# Patient Record
Sex: MALE | Race: WHITE | NOT HISPANIC OR LATINO | Employment: FULL TIME | URBAN - METROPOLITAN AREA
[De-identification: names, ages, dates, MRNs, and addresses within clinical notes are randomized per-mention and may not be internally consistent; named-entity substitution may affect disease eponyms.]

---

## 2024-09-19 PROCEDURE — 93010 ELECTROCARDIOGRAM REPORT: CPT | Performed by: INTERNAL MEDICINE

## 2024-10-09 ENCOUNTER — APPOINTMENT (OUTPATIENT)
Dept: RADIOLOGY | Facility: HOSPITAL | Age: 49
DRG: 291 | End: 2024-10-09
Payer: COMMERCIAL

## 2024-10-09 ENCOUNTER — HOSPITAL ENCOUNTER (INPATIENT)
Facility: HOSPITAL | Age: 49
DRG: 291 | End: 2024-10-09
Attending: EMERGENCY MEDICINE | Admitting: NURSE PRACTITIONER
Payer: COMMERCIAL

## 2024-10-09 ENCOUNTER — APPOINTMENT (OUTPATIENT)
Dept: CARDIOLOGY | Facility: HOSPITAL | Age: 49
DRG: 291 | End: 2024-10-09
Payer: COMMERCIAL

## 2024-10-09 DIAGNOSIS — R06.02 SHORTNESS OF BREATH: ICD-10-CM

## 2024-10-09 DIAGNOSIS — E87.70 HYPERVOLEMIA: Primary | ICD-10-CM

## 2024-10-09 DIAGNOSIS — E83.42 HYPOMAGNESEMIA: ICD-10-CM

## 2024-10-09 DIAGNOSIS — I48.91 UNSPECIFIED ATRIAL FIBRILLATION (MULTI): ICD-10-CM

## 2024-10-09 DIAGNOSIS — I48.19 PERSISTENT ATRIAL FIBRILLATION (MULTI): ICD-10-CM

## 2024-10-09 DIAGNOSIS — I48.91 ATRIAL FIBRILLATION (MULTI): ICD-10-CM

## 2024-10-09 DIAGNOSIS — I50.33 ACUTE ON CHRONIC HEART FAILURE WITH PRESERVED EJECTION FRACTION: ICD-10-CM

## 2024-10-09 DIAGNOSIS — R09.02 HYPOXIA: ICD-10-CM

## 2024-10-09 LAB
ALBUMIN SERPL BCP-MCNC: 4.1 G/DL (ref 3.4–5)
ALP SERPL-CCNC: 41 U/L (ref 33–120)
ALT SERPL W P-5'-P-CCNC: 13 U/L (ref 10–52)
ANION GAP SERPL CALC-SCNC: 11 MMOL/L (ref 10–20)
APPEARANCE UR: CLEAR
AST SERPL W P-5'-P-CCNC: 16 U/L (ref 9–39)
ATRIAL RATE: 83 BPM
ATRIAL RATE: 93 BPM
BASOPHILS # BLD AUTO: 0.06 X10*3/UL (ref 0–0.1)
BASOPHILS NFR BLD AUTO: 0.8 %
BILIRUB SERPL-MCNC: 1.1 MG/DL (ref 0–1.2)
BILIRUB UR STRIP.AUTO-MCNC: NEGATIVE MG/DL
BNP SERPL-MCNC: 312 PG/ML (ref 0–99)
BUN SERPL-MCNC: 22 MG/DL (ref 6–23)
CALCIUM SERPL-MCNC: 9.4 MG/DL (ref 8.6–10.3)
CARDIAC TROPONIN I PNL SERPL HS: 10 NG/L (ref 0–20)
CARDIAC TROPONIN I PNL SERPL HS: 12 NG/L (ref 0–20)
CHLORIDE SERPL-SCNC: 100 MMOL/L (ref 98–107)
CO2 SERPL-SCNC: 30 MMOL/L (ref 21–32)
COLOR UR: YELLOW
CREAT SERPL-MCNC: 0.98 MG/DL (ref 0.5–1.3)
D DIMER PPP FEU-MCNC: 320 NG/ML FEU
EGFRCR SERPLBLD CKD-EPI 2021: >90 ML/MIN/1.73M*2
EOSINOPHIL # BLD AUTO: 0.21 X10*3/UL (ref 0–0.7)
EOSINOPHIL NFR BLD AUTO: 3 %
ERYTHROCYTE [DISTWIDTH] IN BLOOD BY AUTOMATED COUNT: 15.7 % (ref 11.5–14.5)
GLUCOSE BLD MANUAL STRIP-MCNC: 140 MG/DL (ref 74–99)
GLUCOSE SERPL-MCNC: 124 MG/DL (ref 74–99)
GLUCOSE UR STRIP.AUTO-MCNC: ABNORMAL MG/DL
HCT VFR BLD AUTO: 50.8 % (ref 41–52)
HGB BLD-MCNC: 15.5 G/DL (ref 13.5–17.5)
IMM GRANULOCYTES # BLD AUTO: 0.02 X10*3/UL (ref 0–0.7)
IMM GRANULOCYTES NFR BLD AUTO: 0.3 % (ref 0–0.9)
INR PPP: 1.5 (ref 0.9–1.1)
KETONES UR STRIP.AUTO-MCNC: NEGATIVE MG/DL
LACTATE SERPL-SCNC: 1.3 MMOL/L (ref 0.4–2)
LEUKOCYTE ESTERASE UR QL STRIP.AUTO: NEGATIVE
LYMPHOCYTES # BLD AUTO: 0.95 X10*3/UL (ref 1.2–4.8)
LYMPHOCYTES NFR BLD AUTO: 13.4 %
MAGNESIUM SERPL-MCNC: 1.49 MG/DL (ref 1.6–2.4)
MCH RBC QN AUTO: 27 PG (ref 26–34)
MCHC RBC AUTO-ENTMCNC: 30.5 G/DL (ref 32–36)
MCV RBC AUTO: 89 FL (ref 80–100)
MONOCYTES # BLD AUTO: 0.57 X10*3/UL (ref 0.1–1)
MONOCYTES NFR BLD AUTO: 8 %
NEUTROPHILS # BLD AUTO: 5.28 X10*3/UL (ref 1.2–7.7)
NEUTROPHILS NFR BLD AUTO: 74.5 %
NITRITE UR QL STRIP.AUTO: NEGATIVE
NRBC BLD-RTO: 0 /100 WBCS (ref 0–0)
PH UR STRIP.AUTO: 6.5 [PH]
PLATELET # BLD AUTO: 168 X10*3/UL (ref 150–450)
POTASSIUM SERPL-SCNC: 4.1 MMOL/L (ref 3.5–5.3)
PROT SERPL-MCNC: 7 G/DL (ref 6.4–8.2)
PROT UR STRIP.AUTO-MCNC: ABNORMAL MG/DL
PROTHROMBIN TIME: 16.9 SECONDS (ref 9.8–12.8)
Q ONSET: 209 MS
Q ONSET: 212 MS
QRS COUNT: 16 BEATS
QRS COUNT: 17 BEATS
QRS DURATION: 86 MS
QRS DURATION: 90 MS
QT INTERVAL: 346 MS
QT INTERVAL: 358 MS
QTC CALCULATION(BAZETT): 444 MS
QTC CALCULATION(BAZETT): 459 MS
QTC FREDERICIA: 408 MS
QTC FREDERICIA: 423 MS
R AXIS: -68 DEGREES
R AXIS: -74 DEGREES
RBC # BLD AUTO: 5.74 X10*6/UL (ref 4.5–5.9)
RBC # UR STRIP.AUTO: NEGATIVE /UL
RBC #/AREA URNS AUTO: NORMAL /HPF
SARS-COV-2 RNA RESP QL NAA+PROBE: NOT DETECTED
SODIUM SERPL-SCNC: 137 MMOL/L (ref 136–145)
SP GR UR STRIP.AUTO: 1.02
T AXIS: 51 DEGREES
T AXIS: 56 DEGREES
T OFFSET: 385 MS
T OFFSET: 388 MS
UROBILINOGEN UR STRIP.AUTO-MCNC: ABNORMAL MG/DL
VENTRICULAR RATE: 99 BPM
VENTRICULAR RATE: 99 BPM
WBC # BLD AUTO: 7.1 X10*3/UL (ref 4.4–11.3)
WBC #/AREA URNS AUTO: NORMAL /HPF

## 2024-10-09 PROCEDURE — 2500000005 HC RX 250 GENERAL PHARMACY W/O HCPCS: Performed by: PHYSICIAN ASSISTANT

## 2024-10-09 PROCEDURE — 2500000004 HC RX 250 GENERAL PHARMACY W/ HCPCS (ALT 636 FOR OP/ED): Performed by: PHYSICIAN ASSISTANT

## 2024-10-09 PROCEDURE — 93005 ELECTROCARDIOGRAM TRACING: CPT

## 2024-10-09 PROCEDURE — 80053 COMPREHEN METABOLIC PANEL: CPT | Performed by: PHYSICIAN ASSISTANT

## 2024-10-09 PROCEDURE — 84484 ASSAY OF TROPONIN QUANT: CPT | Performed by: PHYSICIAN ASSISTANT

## 2024-10-09 PROCEDURE — 83605 ASSAY OF LACTIC ACID: CPT | Performed by: PHYSICIAN ASSISTANT

## 2024-10-09 PROCEDURE — 81001 URINALYSIS AUTO W/SCOPE: CPT | Performed by: PHYSICIAN ASSISTANT

## 2024-10-09 PROCEDURE — 71045 X-RAY EXAM CHEST 1 VIEW: CPT | Performed by: RADIOLOGY

## 2024-10-09 PROCEDURE — 99285 EMERGENCY DEPT VISIT HI MDM: CPT | Mod: CS

## 2024-10-09 PROCEDURE — 99223 1ST HOSP IP/OBS HIGH 75: CPT | Performed by: NURSE PRACTITIONER

## 2024-10-09 PROCEDURE — 85025 COMPLETE CBC W/AUTO DIFF WBC: CPT | Performed by: PHYSICIAN ASSISTANT

## 2024-10-09 PROCEDURE — 36415 COLL VENOUS BLD VENIPUNCTURE: CPT | Performed by: PHYSICIAN ASSISTANT

## 2024-10-09 PROCEDURE — 87635 SARS-COV-2 COVID-19 AMP PRB: CPT | Performed by: PHYSICIAN ASSISTANT

## 2024-10-09 PROCEDURE — 2500000001 HC RX 250 WO HCPCS SELF ADMINISTERED DRUGS (ALT 637 FOR MEDICARE OP): Performed by: NURSE PRACTITIONER

## 2024-10-09 PROCEDURE — 71275 CT ANGIOGRAPHY CHEST: CPT | Mod: FOREIGN READ | Performed by: RADIOLOGY

## 2024-10-09 PROCEDURE — 85379 FIBRIN DEGRADATION QUANT: CPT | Performed by: PHYSICIAN ASSISTANT

## 2024-10-09 PROCEDURE — 71045 X-RAY EXAM CHEST 1 VIEW: CPT

## 2024-10-09 PROCEDURE — 83735 ASSAY OF MAGNESIUM: CPT | Performed by: PHYSICIAN ASSISTANT

## 2024-10-09 PROCEDURE — 2060000001 HC INTERMEDIATE ICU ROOM DAILY

## 2024-10-09 PROCEDURE — 2550000001 HC RX 255 CONTRASTS: Performed by: PHYSICIAN ASSISTANT

## 2024-10-09 PROCEDURE — 82947 ASSAY GLUCOSE BLOOD QUANT: CPT

## 2024-10-09 PROCEDURE — 85610 PROTHROMBIN TIME: CPT | Performed by: PHYSICIAN ASSISTANT

## 2024-10-09 PROCEDURE — 71275 CT ANGIOGRAPHY CHEST: CPT

## 2024-10-09 PROCEDURE — 83880 ASSAY OF NATRIURETIC PEPTIDE: CPT | Performed by: PHYSICIAN ASSISTANT

## 2024-10-09 RX ORDER — POLYETHYLENE GLYCOL 3350 17 G/17G
17 POWDER, FOR SOLUTION ORAL DAILY
Status: DISCONTINUED | OUTPATIENT
Start: 2024-10-10 | End: 2024-10-14 | Stop reason: HOSPADM

## 2024-10-09 RX ORDER — MAGNESIUM SULFATE HEPTAHYDRATE 40 MG/ML
2 INJECTION, SOLUTION INTRAVENOUS ONCE
Status: COMPLETED | OUTPATIENT
Start: 2024-10-09 | End: 2024-10-09

## 2024-10-09 RX ORDER — DEXTROSE 50 % IN WATER (D50W) INTRAVENOUS SYRINGE
25
Status: DISCONTINUED | OUTPATIENT
Start: 2024-10-09 | End: 2024-10-14 | Stop reason: HOSPADM

## 2024-10-09 RX ORDER — APIXABAN 5 MG/1
5 TABLET, FILM COATED ORAL 2 TIMES DAILY
COMMUNITY

## 2024-10-09 RX ORDER — METOPROLOL TARTRATE 100 MG/1
100 TABLET ORAL 2 TIMES DAILY
COMMUNITY
Start: 2024-09-26 | End: 2024-10-14 | Stop reason: HOSPADM

## 2024-10-09 RX ORDER — POTASSIUM CHLORIDE 750 MG/1
10 CAPSULE, EXTENDED RELEASE ORAL DAILY
Status: ON HOLD | COMMUNITY
Start: 2023-03-24 | End: 2024-10-09 | Stop reason: ALTCHOICE

## 2024-10-09 RX ORDER — ACETAMINOPHEN 325 MG/1
650 TABLET ORAL EVERY 6 HOURS PRN
Status: DISCONTINUED | OUTPATIENT
Start: 2024-10-09 | End: 2024-10-14 | Stop reason: HOSPADM

## 2024-10-09 RX ORDER — GLIPIZIDE 5 MG/1
20 TABLET, FILM COATED, EXTENDED RELEASE ORAL DAILY
Status: DISCONTINUED | OUTPATIENT
Start: 2024-10-10 | End: 2024-10-14 | Stop reason: HOSPADM

## 2024-10-09 RX ORDER — INSULIN LISPRO 100 [IU]/ML
0-5 INJECTION, SOLUTION INTRAVENOUS; SUBCUTANEOUS
Status: DISCONTINUED | OUTPATIENT
Start: 2024-10-10 | End: 2024-10-10

## 2024-10-09 RX ORDER — TIRZEPATIDE 15 MG/.5ML
INJECTION, SOLUTION SUBCUTANEOUS
COMMUNITY
Start: 2024-01-09

## 2024-10-09 RX ORDER — FUROSEMIDE 10 MG/ML
40 INJECTION INTRAMUSCULAR; INTRAVENOUS ONCE
Status: COMPLETED | OUTPATIENT
Start: 2024-10-09 | End: 2024-10-09

## 2024-10-09 RX ORDER — LISINOPRIL 40 MG/1
40 TABLET ORAL DAILY
COMMUNITY
Start: 2024-09-26 | End: 2024-10-14 | Stop reason: HOSPADM

## 2024-10-09 RX ORDER — ACETAMINOPHEN 650 MG/1
650 SUPPOSITORY RECTAL EVERY 6 HOURS PRN
Status: DISCONTINUED | OUTPATIENT
Start: 2024-10-09 | End: 2024-10-14 | Stop reason: HOSPADM

## 2024-10-09 RX ORDER — FUROSEMIDE 10 MG/ML
40 INJECTION INTRAMUSCULAR; INTRAVENOUS 2 TIMES DAILY
Status: DISCONTINUED | OUTPATIENT
Start: 2024-10-10 | End: 2024-10-12

## 2024-10-09 RX ORDER — FUROSEMIDE 20 MG/1
20 TABLET ORAL DAILY
COMMUNITY
Start: 2024-09-26 | End: 2024-10-14 | Stop reason: HOSPADM

## 2024-10-09 RX ORDER — GLIPIZIDE 10 MG/1
20 TABLET, FILM COATED, EXTENDED RELEASE ORAL DAILY
COMMUNITY

## 2024-10-09 RX ORDER — LANOLIN ALCOHOL/MO/W.PET/CERES
400 CREAM (GRAM) TOPICAL DAILY
Status: DISCONTINUED | OUTPATIENT
Start: 2024-10-09 | End: 2024-10-12

## 2024-10-09 RX ORDER — LISINOPRIL 20 MG/1
40 TABLET ORAL DAILY
Status: DISCONTINUED | OUTPATIENT
Start: 2024-10-10 | End: 2024-10-10

## 2024-10-09 RX ORDER — METOPROLOL TARTRATE 50 MG/1
100 TABLET ORAL 2 TIMES DAILY
Status: DISCONTINUED | OUTPATIENT
Start: 2024-10-09 | End: 2024-10-10

## 2024-10-09 RX ORDER — DEXTROSE 50 % IN WATER (D50W) INTRAVENOUS SYRINGE
12.5
Status: DISCONTINUED | OUTPATIENT
Start: 2024-10-09 | End: 2024-10-14 | Stop reason: HOSPADM

## 2024-10-09 RX ORDER — ACETAMINOPHEN 160 MG/5ML
650 SOLUTION ORAL EVERY 6 HOURS PRN
Status: DISCONTINUED | OUTPATIENT
Start: 2024-10-09 | End: 2024-10-14 | Stop reason: HOSPADM

## 2024-10-09 SDOH — ECONOMIC STABILITY: INCOME INSECURITY: IN THE PAST 12 MONTHS, HAS THE ELECTRIC, GAS, OIL, OR WATER COMPANY THREATENED TO SHUT OFF SERVICE IN YOUR HOME?: NO

## 2024-10-09 SDOH — ECONOMIC STABILITY: INCOME INSECURITY: IN THE PAST 12 MONTHS HAS THE ELECTRIC, GAS, OIL, OR WATER COMPANY THREATENED TO SHUT OFF SERVICES IN YOUR HOME?: NO

## 2024-10-09 SDOH — SOCIAL STABILITY: SOCIAL INSECURITY: ARE THERE ANY APPARENT SIGNS OF INJURIES/BEHAVIORS THAT COULD BE RELATED TO ABUSE/NEGLECT?: NO

## 2024-10-09 SDOH — ECONOMIC STABILITY: INCOME INSECURITY: IN THE LAST 12 MONTHS, WAS THERE A TIME WHEN YOU WERE NOT ABLE TO PAY THE MORTGAGE OR RENT ON TIME?: NO

## 2024-10-09 SDOH — SOCIAL STABILITY: SOCIAL INSECURITY: WITHIN THE LAST YEAR, HAVE YOU BEEN AFRAID OF YOUR PARTNER OR EX-PARTNER?: NO

## 2024-10-09 SDOH — SOCIAL STABILITY: SOCIAL INSECURITY: HAVE YOU HAD THOUGHTS OF HARMING ANYONE ELSE?: NO

## 2024-10-09 SDOH — ECONOMIC STABILITY: INCOME INSECURITY: HOW HARD IS IT FOR YOU TO PAY FOR THE VERY BASICS LIKE FOOD, HOUSING, MEDICAL CARE, AND HEATING?: NOT HARD AT ALL

## 2024-10-09 SDOH — ECONOMIC STABILITY: FOOD INSECURITY: WITHIN THE PAST 12 MONTHS, THE FOOD YOU BOUGHT JUST DIDN'T LAST AND YOU DIDN'T HAVE MONEY TO GET MORE.: NEVER TRUE

## 2024-10-09 SDOH — ECONOMIC STABILITY: FOOD INSECURITY: WITHIN THE PAST 12 MONTHS, YOU WORRIED THAT YOUR FOOD WOULD RUN OUT BEFORE YOU GOT MONEY TO BUY MORE.: NEVER TRUE

## 2024-10-09 SDOH — SOCIAL STABILITY: SOCIAL INSECURITY
WITHIN THE LAST YEAR, HAVE TO BEEN RAPED OR FORCED TO HAVE ANY KIND OF SEXUAL ACTIVITY BY YOUR PARTNER OR EX-PARTNER?: NO

## 2024-10-09 SDOH — ECONOMIC STABILITY: FOOD INSECURITY: HOW HARD IS IT FOR YOU TO PAY FOR THE VERY BASICS LIKE FOOD, HOUSING, MEDICAL CARE, AND HEATING?: NOT HARD AT ALL

## 2024-10-09 SDOH — SOCIAL STABILITY: SOCIAL INSECURITY: DOES ANYONE TRY TO KEEP YOU FROM HAVING/CONTACTING OTHER FRIENDS OR DOING THINGS OUTSIDE YOUR HOME?: NO

## 2024-10-09 SDOH — SOCIAL STABILITY: SOCIAL INSECURITY
WITHIN THE LAST YEAR, HAVE YOU BEEN KICKED, HIT, SLAPPED, OR OTHERWISE PHYSICALLY HURT BY YOUR PARTNER OR EX-PARTNER?: NO

## 2024-10-09 SDOH — ECONOMIC STABILITY: FOOD INSECURITY: WITHIN THE PAST 12 MONTHS, YOU WORRIED THAT YOUR FOOD WOULD RUN OUT BEFORE YOU GOT THE MONEY TO BUY MORE.: NEVER TRUE

## 2024-10-09 SDOH — ECONOMIC STABILITY: TRANSPORTATION INSECURITY
IN THE PAST 12 MONTHS, HAS LACK OF TRANSPORTATION KEPT YOU FROM MEETINGS, WORK, OR FROM GETTING THINGS NEEDED FOR DAILY LIVING?: NO

## 2024-10-09 SDOH — SOCIAL STABILITY: SOCIAL INSECURITY: ARE YOU OR HAVE YOU BEEN THREATENED OR ABUSED PHYSICALLY, EMOTIONALLY, OR SEXUALLY BY ANYONE?: NO

## 2024-10-09 SDOH — ECONOMIC STABILITY: HOUSING INSECURITY: IN THE PAST 12 MONTHS, HOW MANY TIMES HAVE YOU MOVED WHERE YOU WERE LIVING?: 0

## 2024-10-09 SDOH — SOCIAL STABILITY: SOCIAL INSECURITY: HAS ANYONE EVER THREATENED TO HURT YOUR FAMILY OR YOUR PETS?: NO

## 2024-10-09 SDOH — SOCIAL STABILITY: SOCIAL INSECURITY: ABUSE: ADULT

## 2024-10-09 SDOH — ECONOMIC STABILITY: HOUSING INSECURITY: IN THE LAST 12 MONTHS, WAS THERE A TIME WHEN YOU WERE NOT ABLE TO PAY THE MORTGAGE OR RENT ON TIME?: NO

## 2024-10-09 SDOH — ECONOMIC STABILITY: HOUSING INSECURITY: AT ANY TIME IN THE PAST 12 MONTHS, WERE YOU HOMELESS OR LIVING IN A SHELTER (INCLUDING NOW)?: NO

## 2024-10-09 SDOH — SOCIAL STABILITY: SOCIAL INSECURITY: DO YOU FEEL ANYONE HAS EXPLOITED OR TAKEN ADVANTAGE OF YOU FINANCIALLY OR OF YOUR PERSONAL PROPERTY?: NO

## 2024-10-09 SDOH — SOCIAL STABILITY: SOCIAL INSECURITY: WITHIN THE LAST YEAR, HAVE YOU BEEN HUMILIATED OR EMOTIONALLY ABUSED IN OTHER WAYS BY YOUR PARTNER OR EX-PARTNER?: NO

## 2024-10-09 SDOH — SOCIAL STABILITY: SOCIAL INSECURITY
WITHIN THE LAST YEAR, HAVE YOU BEEN RAPED OR FORCED TO HAVE ANY KIND OF SEXUAL ACTIVITY BY YOUR PARTNER OR EX-PARTNER?: NO

## 2024-10-09 SDOH — ECONOMIC STABILITY: TRANSPORTATION INSECURITY
IN THE PAST 12 MONTHS, HAS THE LACK OF TRANSPORTATION KEPT YOU FROM MEDICAL APPOINTMENTS OR FROM GETTING MEDICATIONS?: NO

## 2024-10-09 SDOH — ECONOMIC STABILITY: TRANSPORTATION INSECURITY: IN THE PAST 12 MONTHS, HAS LACK OF TRANSPORTATION KEPT YOU FROM MEDICAL APPOINTMENTS OR FROM GETTING MEDICATIONS?: NO

## 2024-10-09 SDOH — SOCIAL STABILITY: SOCIAL INSECURITY: DO YOU FEEL UNSAFE GOING BACK TO THE PLACE WHERE YOU ARE LIVING?: NO

## 2024-10-09 SDOH — SOCIAL STABILITY: SOCIAL INSECURITY: WERE YOU ABLE TO COMPLETE ALL THE BEHAVIORAL HEALTH SCREENINGS?: YES

## 2024-10-09 ASSESSMENT — ENCOUNTER SYMPTOMS
DIARRHEA: 0
NAUSEA: 0
FLANK PAIN: 0
CONSTIPATION: 0
ABDOMINAL DISTENTION: 1
HEMATURIA: 0
ABDOMINAL PAIN: 0
CHILLS: 0
FREQUENCY: 0
FEVER: 0
SHORTNESS OF BREATH: 1
VOMITING: 0
DYSURIA: 0
PALPITATIONS: 0

## 2024-10-09 ASSESSMENT — COGNITIVE AND FUNCTIONAL STATUS - GENERAL
MOBILITY SCORE: 24
PATIENT BASELINE BEDBOUND: NO
DAILY ACTIVITIY SCORE: 24

## 2024-10-09 ASSESSMENT — LIFESTYLE VARIABLES
AUDIT-C TOTAL SCORE: 0
HAVE PEOPLE ANNOYED YOU BY CRITICIZING YOUR DRINKING: NO
TOTAL SCORE: 0
HOW OFTEN DO YOU HAVE 6 OR MORE DRINKS ON ONE OCCASION: NEVER
HOW OFTEN DO YOU HAVE A DRINK CONTAINING ALCOHOL: NEVER
EVER FELT BAD OR GUILTY ABOUT YOUR DRINKING: NO
HOW MANY STANDARD DRINKS CONTAINING ALCOHOL DO YOU HAVE ON A TYPICAL DAY: PATIENT DOES NOT DRINK
HAVE YOU EVER FELT YOU SHOULD CUT DOWN ON YOUR DRINKING: NO
EVER HAD A DRINK FIRST THING IN THE MORNING TO STEADY YOUR NERVES TO GET RID OF A HANGOVER: NO
AUDIT-C TOTAL SCORE: 0
SKIP TO QUESTIONS 9-10: 1

## 2024-10-09 ASSESSMENT — PATIENT HEALTH QUESTIONNAIRE - PHQ9
SUM OF ALL RESPONSES TO PHQ9 QUESTIONS 1 & 2: 0
1. LITTLE INTEREST OR PLEASURE IN DOING THINGS: NOT AT ALL
2. FEELING DOWN, DEPRESSED OR HOPELESS: NOT AT ALL

## 2024-10-09 ASSESSMENT — ACTIVITIES OF DAILY LIVING (ADL)
DRESSING YOURSELF: INDEPENDENT
LACK_OF_TRANSPORTATION: NO
LACK_OF_TRANSPORTATION: NO
ADEQUATE_TO_COMPLETE_ADL: YES
LACK_OF_TRANSPORTATION: NO
JUDGMENT_ADEQUATE_SAFELY_COMPLETE_DAILY_ACTIVITIES: YES
HEARING - RIGHT EAR: FUNCTIONAL
BATHING: INDEPENDENT
TOILETING: INDEPENDENT
GROOMING: INDEPENDENT
HEARING - LEFT EAR: FUNCTIONAL
PATIENT'S MEMORY ADEQUATE TO SAFELY COMPLETE DAILY ACTIVITIES?: YES
FEEDING YOURSELF: INDEPENDENT
WALKS IN HOME: INDEPENDENT

## 2024-10-09 ASSESSMENT — PAIN SCALES - GENERAL: PAINLEVEL_OUTOF10: 0 - NO PAIN

## 2024-10-09 ASSESSMENT — PAIN DESCRIPTION - PROGRESSION: CLINICAL_PROGRESSION: NOT CHANGED

## 2024-10-09 ASSESSMENT — PAIN - FUNCTIONAL ASSESSMENT: PAIN_FUNCTIONAL_ASSESSMENT: 0-10

## 2024-10-09 NOTE — ED PROVIDER NOTES
HPI   Chief Complaint   Patient presents with    Shortness of Breath       HPI        Patient History   Past Medical History:   Diagnosis Date    COPD (chronic obstructive pulmonary disease) (Multi)      History reviewed. No pertinent surgical history.  No family history on file.  Social History     Tobacco Use    Smoking status: Unknown    Smokeless tobacco: Not on file   Substance Use Topics    Alcohol use: Defer    Drug use: Defer       Physical Exam   ED Triage Vitals   Temperature Heart Rate Respirations BP   10/09/24 1208 10/09/24 1208 10/09/24 1208 10/09/24 1208   36.6 °C (97.9 °F) 98 20 180/83      Pulse Ox Temp Source Heart Rate Source Patient Position   10/09/24 1208 10/09/24 1208 10/09/24 1208 10/09/24 1315   (!) 92 % Temporal Monitor Sitting      BP Location FiO2 (%)     10/09/24 1208 --     Right arm        Physical Exam      ED Course & MDM   Diagnoses as of 10/09/24 1949   Persistent atrial fibrillation (Multi)   Shortness of breath   Hypomagnesemia   Hypoxia   Hypervolemia                 No data recorded                         Labs Reviewed   CBC WITH AUTO DIFFERENTIAL - Abnormal       Result Value    WBC 7.1      nRBC 0.0      RBC 5.74      Hemoglobin 15.5      Hematocrit 50.8      MCV 89      MCH 27.0      MCHC 30.5 (*)     RDW 15.7 (*)     Platelets 168      Neutrophils % 74.5      Immature Granulocytes %, Automated 0.3      Lymphocytes % 13.4      Monocytes % 8.0      Eosinophils % 3.0      Basophils % 0.8      Neutrophils Absolute 5.28      Immature Granulocytes Absolute, Automated 0.02      Lymphocytes Absolute 0.95 (*)     Monocytes Absolute 0.57      Eosinophils Absolute 0.21      Basophils Absolute 0.06     COMPREHENSIVE METABOLIC PANEL - Abnormal    Glucose 124 (*)     Sodium 137      Potassium 4.1      Chloride 100      Bicarbonate 30      Anion Gap 11      Urea Nitrogen 22      Creatinine 0.98      eGFR >90      Calcium 9.4      Albumin 4.1      Alkaline Phosphatase 41      Total  Protein 7.0      AST 16      Bilirubin, Total 1.1      ALT 13     MAGNESIUM - Abnormal    Magnesium 1.49 (*)    PROTIME-INR - Abnormal    Protime 16.9 (*)     INR 1.5 (*)    B-TYPE NATRIURETIC PEPTIDE - Abnormal     (*)     Narrative:        <100 pg/mL - Heart failure unlikely  100-299 pg/mL - Intermediate probability of acute heart                  failure exacerbation. Correlate with clinical                  context and patient history.    >=300 pg/mL - Heart Failure likely. Correlate with clinical                  context and patient history.    BNP testing is performed using different testing methodology at [facility] than at other [facility]. Direct result comparisons should only be made within the same method.      URINALYSIS WITH REFLEX CULTURE AND MICROSCOPIC - Abnormal    Color, Urine Yellow      Appearance, Urine Clear      Specific Gravity, Urine 1.021      pH, Urine 6.5      Protein, Urine 30 (1+) (*)     Glucose, Urine 30 (TRACE) (*)     Blood, Urine NEGATIVE      Ketones, Urine NEGATIVE      Bilirubin, Urine NEGATIVE      Urobilinogen, Urine 2 (1+) (*)     Nitrite, Urine NEGATIVE      Leukocyte Esterase, Urine NEGATIVE     LACTATE - Normal    Lactate 1.3      Narrative:     Venipuncture immediately after or during the administration of Metamizole may lead to falsely low results. Testing should be performed immediately prior to Metamizole dosing.   SARS-COV-2 PCR - Normal    Coronavirus 2019, PCR Not Detected      Narrative:     This assay has received FDA Emergency Use Authorization (EUA) and is only authorized for the duration of time that circumstances exist to justify the authorization of the emergency use of in vitro diagnostic tests for the detection of SARS-CoV-2 virus and/or diagnosis of COVID-19 infection under section 564(b)(1) of the Act, 21 U.S.C. 360bbb-3(b)(1). This assay is an in vitro diagnostic nucleic acid amplification test for the qualitative detection of  SARS-CoV-2 from nasopharyngeal specimens and has been validated for use at [facility]. Negative results do not preclude COVID-19 infections and should not be used as the sole basis for diagnosis, treatment, or other management decisions.     D-DIMER, VTE EXCLUSION - Normal    D-Dimer, Quantitative VTE Exclusion 320      Narrative:     The VTE Exclusion D-Dimer assay is reported in ng/mL Fibrinogen Equivalent Units (FEU).    Per 's instructions for use, a value of less than 500 ng/mL (FEU) may help to exclude DVT or PE in outpatients when the assay is used with a clinical pretest probability assessment.([facility] must utilize and document eCalc 'Wells Score Deep Vein Thrombosis Risk' for DVT exclusion only. Emergency Department should utilize  Guidelines for Emergency Department Use of the VTE Exclusion D-Dimer and Clinical Pretest probability assessment model for DVT or PE exclusion.)   SERIAL TROPONIN-INITIAL - Normal    Troponin I, High Sensitivity 10      Narrative:     Less than 99th percentile of normal range cutoff-  Female and children under 18 years old <14 ng/L; Male <21 ng/L: Negative  Repeat testing should be performed if clinically indicated.     Female and children under 18 years old 14-50 ng/L; Male 21-50 ng/L:  Consistent with possible cardiac damage and possible increased clinical   risk. Serial measurements may help to assess extent of myocardial damage.     >50 ng/L: Consistent with cardiac damage, increased clinical risk and  myocardial infarction. Serial measurements may help assess extent of   myocardial damage.      NOTE: Children less than 1 year old may have higher baseline troponin   levels and results should be interpreted in conjunction with the overall   clinical context.     NOTE: Troponin I testing is performed using a different   testing methodology at [facility] than at other   [facility] hospitals. Direct result comparisons should only   be  made within the same method.   SERIAL TROPONIN, 1 HOUR - Normal    Troponin I, High Sensitivity 12      Narrative:     Less than 99th percentile of normal range cutoff-  Female and children under 18 years old <14 ng/L; Male <21 ng/L: Negative  Repeat testing should be performed if clinically indicated.     Female and children under 18 years old 14-50 ng/L; Male 21-50 ng/L:  Consistent with possible cardiac damage and possible increased clinical   risk. Serial measurements may help to assess extent of myocardial damage.     >50 ng/L: Consistent with cardiac damage, increased clinical risk and  myocardial infarction. Serial measurements may help assess extent of   myocardial damage.      NOTE: Children less than 1 year old may have higher baseline troponin   levels and results should be interpreted in conjunction with the overall   clinical context.     NOTE: Troponin I testing is performed using a different   testing methodology at [facility] than at other   [facility]. Direct result comparisons should only   be made within the same method.   URINALYSIS MICROSCOPIC WITH REFLEX CULTURE - Normal    WBC, Urine NONE      RBC, Urine 1-2     TROPONIN SERIES- (INITIAL, 1 HR)    Narrative:     The following orders were created for panel order Troponin I Series, High Sensitivity (0, 1 HR).  Procedure                               Abnormality         Status                     ---------                               -----------         ------                     Troponin I, High Sensiti...[[id number]]  Normal              Final result               Troponin, High Sensitivi...[[id number]]  Normal              Final result                 Please view results for these tests on the individual orders.   URINALYSIS WITH REFLEX CULTURE AND MICROSCOPIC    Narrative:     The following orders were created for panel order Urinalysis with Reflex Culture and Microscopic.  Procedure                                Abnormality         Status                     ---------                               -----------         ------                     Urinalysis with Reflex C...[[id number]]  Abnormal            Final result               Extra Urine Gray Tube[[id number]]                            In process                   Please view results for these tests on the individual orders.   EXTRA URINE GRAY TUBE      CT angio chest for pulmonary embolism   Final Result   Endotracheal tube with tip projecting above the april.   No evidence of pulmonary embolism or acute thoracic aortic pathology.   Diffuse bilateral groundglass opacities which may be infectious or   inflammatory. No pulmonary consolidation.   Signed by Delmar Buck MD      XR chest 1 view   Final Result   1.  Cardiomegaly.   2. Irregular interstitial prominence greatest toward the lung bases   may represent vascular congestion/edema. Infection not excluded.                  MACRO:   None.        Signed by: Yonas Toribio 10/9/2024 12:58 PM   Dictation workstation:   [id number]                Medical Decision Making  Patient handed off to me by Darrick Javed PA-C at 1600 pending CT PE study.  No acute findings on final read of CTA PE study.  He does have bilateral groundglass opacities that may be infectious or inflammatory.  He states that he has not had of any cough or URI type symptoms.  Unlikely pneumonia.  Magnesium 1.49 and will be given 2 g IV.  Possibly fluid overload and will be given Lasix 40 mg IV.  Walking pulse ox is 77% on room air and due to needing oxygen, will admit to the hospitalist.  Case discussed with MIROSLAVA Miramontes hospitalist who accepts patient for admission. Discussed starting ATBs, solumedrol and nebulizer treatments, but he states likely fluid overload and will hold on these treatments. ABG ordered and pending.    Procedure  Procedures     Jody Burnett PA-C  10/09/24 1934       Jody Burnett PA-C  10/09/24 1949

## 2024-10-09 NOTE — ED PROVIDER NOTES
HPI   Chief Complaint   Patient presents with    Shortness of Breath       A 48-year-old male patient with history of trach secondary to sleep apnea comes into the emergency department today with complaints of increasing shortness of breath as well as exertional dyspnea and lower extremity edema over the last month.  He states that about a month ago he converted to atrial fibrillation.  He states he had 2 cardioversions that were unsuccessful.  He states he has been getting lower extremity edema which she was put on Lasix for 20 mg once a day but it did not improve things.  On his own he started taking 40 mg twice a day.  He states he still continues to not have any improvement.  He states he is progressively having increasing shortness of breath cannot lay flat at night and lower extremity edema.  Denies any associated chest pain.  Denies any fevers or chills.  For this purpose comes into the emergency department today further evaluation.  Otherwise no complaints present time.              Patient History   Past Medical History:   Diagnosis Date    COPD (chronic obstructive pulmonary disease) (Multi)      History reviewed. No pertinent surgical history.  No family history on file.  Social History     Tobacco Use    Smoking status: Unknown    Smokeless tobacco: Not on file   Substance Use Topics    Alcohol use: Defer    Drug use: Defer       Physical Exam   ED Triage Vitals   Temperature Heart Rate Respirations BP   10/09/24 1208 10/09/24 1208 10/09/24 1208 10/09/24 1208   36.6 °C (97.9 °F) 98 20 180/83      Pulse Ox Temp Source Heart Rate Source Patient Position   10/09/24 1208 10/09/24 1208 10/09/24 1208 10/09/24 1315   (!) 92 % Temporal Monitor Sitting      BP Location FiO2 (%)     10/09/24 1208 --     Right arm        Physical Exam  Constitutional:       General: He is in acute distress.      Appearance: Normal appearance. He is well-developed. He is obese. He is not ill-appearing.   HENT:      Head:  Normocephalic and atraumatic.      Nose: Nose normal.   Eyes:      Extraocular Movements: Extraocular movements intact.      Conjunctiva/sclera: Conjunctivae normal.      Pupils: Pupils are equal, round, and reactive to light.   Cardiovascular:      Rate and Rhythm: Regular rhythm. Tachycardia present.   Pulmonary:      Effort: Pulmonary effort is normal. Tachypnea present. No respiratory distress.      Breath sounds: Normal breath sounds. No stridor. No decreased breath sounds or wheezing.   Musculoskeletal:         General: Normal range of motion.      Cervical back: Normal range of motion.   Skin:     General: Skin is warm and dry.   Neurological:      General: No focal deficit present.      Mental Status: He is alert and oriented to person, place, and time. Mental status is at baseline.           ED Course & MDM   Diagnoses as of 10/09/24 1612   Persistent atrial fibrillation (Multi)                 No data recorded                                 Medical Decision Making  A 48-year-old male patient with history of trach secondary to sleep apnea comes into the emergency department today with complaints of increasing shortness of breath as well as exertional dyspnea and lower extremity edema over the last month.  He states that about a month ago he converted to atrial fibrillation.  He states he had 2 cardioversions that were unsuccessful.  He states he has been getting lower extremity edema which she was put on Lasix for 20 mg once a day but it did not improve things.  On his own he started taking 40 mg twice a day.  He states he still continues to not have any improvement.  He states he is progressively having increasing shortness of breath cannot lay flat at night and lower extremity edema.  Denies any associated chest pain.  Denies any fevers or chills.  For this purpose comes into the emergency department today further evaluation.  Otherwise no complaints present time.    EKG, chest x-ray, laboratory studies  are ordered to rule out ACS, arrhythmias, lecture light abnormalities, leukocytosis, acute kidney injury, pneumonia, pneumothorax, pulmonary emboli with PE study.  Rule out congestive heart failure.  Oxygen ordered for the patient.    EKG: My EKG interpretation, 9 bpm, atrial fibrillation, no ST elevations, T wave abnormalities    Handoff to Jody Burnett PA-C pending laboratory studies, CT PE study, reevaluation and disposition  Labs Reviewed   CBC WITH AUTO DIFFERENTIAL - Abnormal       Result Value    WBC 7.1      nRBC 0.0      RBC 5.74      Hemoglobin 15.5      Hematocrit 50.8      MCV 89      MCH 27.0      MCHC 30.5 (*)     RDW 15.7 (*)     Platelets 168      Neutrophils % 74.5      Immature Granulocytes %, Automated 0.3      Lymphocytes % 13.4      Monocytes % 8.0      Eosinophils % 3.0      Basophils % 0.8      Neutrophils Absolute 5.28      Immature Granulocytes Absolute, Automated 0.02      Lymphocytes Absolute 0.95 (*)     Monocytes Absolute 0.57      Eosinophils Absolute 0.21      Basophils Absolute 0.06     COMPREHENSIVE METABOLIC PANEL - Abnormal    Glucose 124 (*)     Sodium 137      Potassium 4.1      Chloride 100      Bicarbonate 30      Anion Gap 11      Urea Nitrogen 22      Creatinine 0.98      eGFR >90      Calcium 9.4      Albumin 4.1      Alkaline Phosphatase 41      Total Protein 7.0      AST 16      Bilirubin, Total 1.1      ALT 13     MAGNESIUM - Abnormal    Magnesium 1.49 (*)    PROTIME-INR - Abnormal    Protime 16.9 (*)     INR 1.5 (*)    B-TYPE NATRIURETIC PEPTIDE - Abnormal     (*)     Narrative:        <100 pg/mL - Heart failure unlikely  100-299 pg/mL - Intermediate probability of acute heart                  failure exacerbation. Correlate with clinical                  context and patient history.    >=300 pg/mL - Heart Failure likely. Correlate with clinical                  context and patient history.    BNP testing is performed using different testing methodology at  [facility] than at other [facility]. Direct result comparisons should only be made within the same method.      LACTATE - Normal    Lactate 1.3      Narrative:     Venipuncture immediately after or during the administration of Metamizole may lead to falsely low results. Testing should be performed immediately prior to Metamizole dosing.   SARS-COV-2 PCR - Normal    Coronavirus 2019, PCR Not Detected      Narrative:     This assay has received FDA Emergency Use Authorization (EUA) and is only authorized for the duration of time that circumstances exist to justify the authorization of the emergency use of in vitro diagnostic tests for the detection of SARS-CoV-2 virus and/or diagnosis of COVID-19 infection under section 564(b)(1) of the Act, 21 U.S.C. 360bbb-3(b)(1). This assay is an in vitro diagnostic nucleic acid amplification test for the qualitative detection of SARS-CoV-2 from nasopharyngeal specimens and has been validated for use at [facility]. Negative results do not preclude COVID-19 infections and should not be used as the sole basis for diagnosis, treatment, or other management decisions.     D-DIMER, VTE EXCLUSION - Normal    D-Dimer, Quantitative VTE Exclusion 320      Narrative:     The VTE Exclusion D-Dimer assay is reported in ng/mL Fibrinogen Equivalent Units (FEU).    Per 's instructions for use, a value of less than 500 ng/mL (FEU) may help to exclude DVT or PE in outpatients when the assay is used with a clinical pretest probability assessment.([facility] must utilize and document eCalc 'Wells Score Deep Vein Thrombosis Risk' for DVT exclusion only. Emergency Department should utilize  Guidelines for Emergency Department Use of the VTE Exclusion D-Dimer and Clinical Pretest probability assessment model for DVT or PE exclusion.)   SERIAL TROPONIN-INITIAL - Normal    Troponin I, High Sensitivity 10      Narrative:     Less than 99th percentile of  normal range cutoff-  Female and children under 18 years old <14 ng/L; Male <21 ng/L: Negative  Repeat testing should be performed if clinically indicated.     Female and children under 18 years old 14-50 ng/L; Male 21-50 ng/L:  Consistent with possible cardiac damage and possible increased clinical   risk. Serial measurements may help to assess extent of myocardial damage.     >50 ng/L: Consistent with cardiac damage, increased clinical risk and  myocardial infarction. Serial measurements may help assess extent of   myocardial damage.      NOTE: Children less than 1 year old may have higher baseline troponin   levels and results should be interpreted in conjunction with the overall   clinical context.     NOTE: Troponin I testing is performed using a different   testing methodology at [facility] than at other   [facility]. Direct result comparisons should only   be made within the same method.   TROPONIN SERIES- (INITIAL, 1 HR)    Narrative:     The following orders were created for panel order Troponin I Series, High Sensitivity (0, 1 HR).  Procedure                               Abnormality         Status                     ---------                               -----------         ------                     Troponin I, High Sensiti...[[id number]]  Normal              Final result               Troponin, High Sensitivi...[[id number]]                      In process                   Please view results for these tests on the individual orders.   URINALYSIS WITH REFLEX CULTURE AND MICROSCOPIC    Narrative:     The following orders were created for panel order Urinalysis with Reflex Culture and Microscopic.  Procedure                               Abnormality         Status                     ---------                               -----------         ------                     Urinalysis with Reflex C...[[id number]]                                                 Extra Urine Shin  Tube[[id number]]                                                         Please view results for these tests on the individual orders.   URINALYSIS WITH REFLEX CULTURE AND MICROSCOPIC   EXTRA URINE GRAY TUBE   SERIAL TROPONIN, 1 HOUR        XR chest 1 view   Final Result   1.  Cardiomegaly.   2. Irregular interstitial prominence greatest toward the lung bases   may represent vascular congestion/edema. Infection not excluded.                  MACRO:   None.        Signed by: Yonas Toribio 10/9/2024 12:58 PM   Dictation workstation:   [id number]      CT angio chest for pulmonary embolism    (Results Pending)       Procedure  Procedures     Darrick Javed PA-C  10/09/24 7018

## 2024-10-10 ENCOUNTER — APPOINTMENT (OUTPATIENT)
Dept: CARDIOLOGY | Facility: HOSPITAL | Age: 49
DRG: 291 | End: 2024-10-10
Payer: COMMERCIAL

## 2024-10-10 LAB
ANION GAP SERPL CALC-SCNC: 10 MMOL/L (ref 10–20)
AORTIC VALVE MEAN GRADIENT: 3 MMHG
AORTIC VALVE PEAK VELOCITY: 1.15 M/S
ATRIAL RATE: 105 BPM
ATRIAL RATE: 107 BPM
AV PEAK GRADIENT: 5.3 MMHG
AVA (PEAK VEL): 2.53 CM2
AVA (VTI): 2.39 CM2
BUN SERPL-MCNC: 21 MG/DL (ref 6–23)
CALCIUM SERPL-MCNC: 9 MG/DL (ref 8.6–10.3)
CHLORIDE SERPL-SCNC: 97 MMOL/L (ref 98–107)
CHOLEST SERPL-MCNC: 112 MG/DL (ref 0–199)
CHOLESTEROL/HDL RATIO: 4.4
CO2 SERPL-SCNC: 33 MMOL/L (ref 21–32)
CREAT SERPL-MCNC: 0.94 MG/DL (ref 0.5–1.3)
EGFRCR SERPLBLD CKD-EPI 2021: >90 ML/MIN/1.73M*2
EJECTION FRACTION APICAL 4 CHAMBER: 32.4
EJECTION FRACTION: 70 %
ERYTHROCYTE [DISTWIDTH] IN BLOOD BY AUTOMATED COUNT: 15.6 % (ref 11.5–14.5)
GLUCOSE BLD MANUAL STRIP-MCNC: 107 MG/DL (ref 74–99)
GLUCOSE BLD MANUAL STRIP-MCNC: 138 MG/DL (ref 74–99)
GLUCOSE BLD MANUAL STRIP-MCNC: 153 MG/DL (ref 74–99)
GLUCOSE BLD MANUAL STRIP-MCNC: 190 MG/DL (ref 74–99)
GLUCOSE BLD MANUAL STRIP-MCNC: 86 MG/DL (ref 74–99)
GLUCOSE SERPL-MCNC: 122 MG/DL (ref 74–99)
HCT VFR BLD AUTO: 48.6 % (ref 41–52)
HDLC SERPL-MCNC: 25.3 MG/DL
HGB BLD-MCNC: 14.8 G/DL (ref 13.5–17.5)
HOLD SPECIMEN: NORMAL
HOLD SPECIMEN: NORMAL
LDLC SERPL CALC-MCNC: 64 MG/DL
LEFT ATRIUM VOLUME AREA LENGTH INDEX BSA: 44.4 ML/M2
LEFT VENTRICLE INTERNAL DIMENSION DIASTOLE: 4.97 CM (ref 3.5–6)
LEFT VENTRICULAR OUTFLOW TRACT DIAMETER: 2 CM
LV EJECTION FRACTION BIPLANE: 29 %
MAGNESIUM SERPL-MCNC: 1.81 MG/DL (ref 1.6–2.4)
MCH RBC QN AUTO: 26.8 PG (ref 26–34)
MCHC RBC AUTO-ENTMCNC: 30.5 G/DL (ref 32–36)
MCV RBC AUTO: 88 FL (ref 80–100)
NON HDL CHOLESTEROL: 87 MG/DL (ref 0–149)
NRBC BLD-RTO: 0 /100 WBCS (ref 0–0)
PLATELET # BLD AUTO: 185 X10*3/UL (ref 150–450)
POTASSIUM SERPL-SCNC: 3.9 MMOL/L (ref 3.5–5.3)
Q ONSET: 212 MS
Q ONSET: 214 MS
QRS COUNT: 15 BEATS
QRS COUNT: 15 BEATS
QRS DURATION: 86 MS
QRS DURATION: 88 MS
QT INTERVAL: 366 MS
QT INTERVAL: 372 MS
QTC CALCULATION(BAZETT): 457 MS
QTC CALCULATION(BAZETT): 474 MS
QTC FREDERICIA: 425 MS
QTC FREDERICIA: 438 MS
R AXIS: -67 DEGREES
R AXIS: -69 DEGREES
RBC # BLD AUTO: 5.52 X10*6/UL (ref 4.5–5.9)
RIGHT VENTRICLE PEAK SYSTOLIC PRESSURE: 33 MMHG
SODIUM SERPL-SCNC: 136 MMOL/L (ref 136–145)
T AXIS: 35 DEGREES
T AXIS: 36 DEGREES
T OFFSET: 395 MS
T OFFSET: 400 MS
TRICUSPID ANNULAR PLANE SYSTOLIC EXCURSION: 2 CM
TRIGL SERPL-MCNC: 113 MG/DL (ref 0–149)
VENTRICULAR RATE: 94 BPM
VENTRICULAR RATE: 98 BPM
VLDL: 23 MG/DL (ref 0–40)
WBC # BLD AUTO: 7.7 X10*3/UL (ref 4.4–11.3)

## 2024-10-10 PROCEDURE — 93005 ELECTROCARDIOGRAM TRACING: CPT

## 2024-10-10 PROCEDURE — 99223 1ST HOSP IP/OBS HIGH 75: CPT | Performed by: INTERNAL MEDICINE

## 2024-10-10 PROCEDURE — 85027 COMPLETE CBC AUTOMATED: CPT | Performed by: NURSE PRACTITIONER

## 2024-10-10 PROCEDURE — 2500000004 HC RX 250 GENERAL PHARMACY W/ HCPCS (ALT 636 FOR OP/ED): Performed by: NURSE PRACTITIONER

## 2024-10-10 PROCEDURE — 2500000001 HC RX 250 WO HCPCS SELF ADMINISTERED DRUGS (ALT 637 FOR MEDICARE OP): Performed by: NURSE PRACTITIONER

## 2024-10-10 PROCEDURE — 36415 COLL VENOUS BLD VENIPUNCTURE: CPT | Performed by: NURSE PRACTITIONER

## 2024-10-10 PROCEDURE — 93010 ELECTROCARDIOGRAM REPORT: CPT | Performed by: INTERNAL MEDICINE

## 2024-10-10 PROCEDURE — 82947 ASSAY GLUCOSE BLOOD QUANT: CPT

## 2024-10-10 PROCEDURE — 2060000001 HC INTERMEDIATE ICU ROOM DAILY

## 2024-10-10 PROCEDURE — 93306 TTE W/DOPPLER COMPLETE: CPT | Performed by: INTERNAL MEDICINE

## 2024-10-10 PROCEDURE — 80061 LIPID PANEL: CPT | Performed by: NURSE PRACTITIONER

## 2024-10-10 PROCEDURE — 99233 SBSQ HOSP IP/OBS HIGH 50: CPT | Performed by: STUDENT IN AN ORGANIZED HEALTH CARE EDUCATION/TRAINING PROGRAM

## 2024-10-10 PROCEDURE — 2500000002 HC RX 250 W HCPCS SELF ADMINISTERED DRUGS (ALT 637 FOR MEDICARE OP, ALT 636 FOR OP/ED): Performed by: STUDENT IN AN ORGANIZED HEALTH CARE EDUCATION/TRAINING PROGRAM

## 2024-10-10 PROCEDURE — 2500000004 HC RX 250 GENERAL PHARMACY W/ HCPCS (ALT 636 FOR OP/ED): Performed by: PHYSICIAN ASSISTANT

## 2024-10-10 PROCEDURE — 93306 TTE W/DOPPLER COMPLETE: CPT

## 2024-10-10 PROCEDURE — 83735 ASSAY OF MAGNESIUM: CPT | Performed by: NURSE PRACTITIONER

## 2024-10-10 PROCEDURE — 82374 ASSAY BLOOD CARBON DIOXIDE: CPT | Performed by: NURSE PRACTITIONER

## 2024-10-10 RX ORDER — LOSARTAN POTASSIUM 50 MG/1
50 TABLET ORAL DAILY
Status: DISCONTINUED | OUTPATIENT
Start: 2024-10-10 | End: 2024-10-14 | Stop reason: HOSPADM

## 2024-10-10 RX ORDER — INSULIN LISPRO 100 [IU]/ML
0-10 INJECTION, SOLUTION INTRAVENOUS; SUBCUTANEOUS
Status: DISCONTINUED | OUTPATIENT
Start: 2024-10-10 | End: 2024-10-14 | Stop reason: HOSPADM

## 2024-10-10 RX ORDER — SOTALOL HYDROCHLORIDE 120 MG/1
120 TABLET ORAL 2 TIMES DAILY
Status: DISCONTINUED | OUTPATIENT
Start: 2024-10-10 | End: 2024-10-14 | Stop reason: HOSPADM

## 2024-10-10 ASSESSMENT — COGNITIVE AND FUNCTIONAL STATUS - GENERAL
MOBILITY SCORE: 24
MOBILITY SCORE: 24
DAILY ACTIVITIY SCORE: 24
DAILY ACTIVITIY SCORE: 24

## 2024-10-10 ASSESSMENT — ACTIVITIES OF DAILY LIVING (ADL): LACK_OF_TRANSPORTATION: NO

## 2024-10-10 ASSESSMENT — PAIN SCALES - GENERAL
PAINLEVEL_OUTOF10: 0 - NO PAIN
PAINLEVEL_OUTOF10: 0 - NO PAIN

## 2024-10-10 NOTE — PROGRESS NOTES
10/10/24 1337   Discharge Planning   Living Arrangements Alone   Assistance Needed trach collar, home O2  serviced by [facility]   Type of Residence Private residence   Home or Post Acute Services In home services   Type of Home Care Services DME or oxygen   Expected Discharge Disposition Home   Does the patient need discharge transport arranged? No   Transportation Needs   In the past 12 months, has lack of transportation kept you from medical appointments or from getting medications? no   In the past 12 months, has lack of transportation kept you from meetings, work, or from getting things needed for daily living? No   Patient Choice   Patient / Family choosing to utilize agency / facility established prior to hospitalization No     Met with patient at bedside to discuss dc planning needs.  Pt resides at home alone. Has previous Tracheostomy .  Does not use home o2 during day  , uses 8 liter via trach collar at bedtime.  PT states he works, drives and is independent. Follows with Dr Jody Timmnos as pcp in [place]. Preferred pharmacy is [facility] [place].  Pt states he does not need any assistance and plans to transport self home at discharge.  Care transitions team will continue to follow if needs should arise.

## 2024-10-10 NOTE — CARE PLAN
The patient's goals for the shift include      The clinical goals for the shift include o2 levels

## 2024-10-10 NOTE — DISCHARGE INSTRUCTIONS
CARDIOVERSION DISCHARGE INSTRUCTIONS    FOR SUDDEN AND SEVERE CHEST PAIN, SHORTNESS OF BREATH, SIGNS OF STROKE OR CHANGES IN MENTAL STATUS YOU SHOULD CALL 911 IMMEDIATELY.    FOR NEXT 24 HOURS  - Upon discharge, you should return home and rest for the remainder of the day and evening. You do not have to stay on bed rest but should not be very active.    It is recommended a responsible adult be with you for the first 24 hours after the procedure.    - No driving for 24 hours after procedure.  Please arrange for someone to drive you home from the hospital today.  No driving until your follow-up appointment with your provider if you have had a passing out spell in the recent past or previously restricted from driving.     - Do not drive, operate machinery, or use power tools for 24 hours after your procedure.     - Do not make any legal decisions for 24 hours after your procedure.     - Do not drink alcoholic beverages for 24 hours after your procedure.         HEART FAILURE EDUCATION:  1. Weigh yourself daily and record on your weight log.  2. If you gain more than 2 or 3 pounds overnight, call your cardiologist.  3. Follow a low sodium diet. No more than 2000 mg in one day, or more than 650 mg per meal.  4. Limit total fluids to no more than 8 cups (or 2 liters) per day - this includes all fluids (water, coffee, juice, milk, tea, etc.)  5. Monitor your blood pressure daily and record on your weight log.  6. Call to schedule your follow-up appointments when you get home if they were not already scheduled for you.  7. Keep your follow-up appointments! Bring your weight log with you so the doctors can see your weight trend and blood pressure readings.  8. Be sure to  any new prescriptions and take them as directed. If unsure of the medications, be sure to call your cardiologist.  9. Stay as active as you can tolerate.   10. If you notice subtle change of symptoms (slight increase in swelling, slight  shortness of breath, a new intolerance to laying flat, a new cough), be sure to call your cardiologist.  11. If you have any questions or concerns or you have not heard back from the cardiologist, feel free to call Eri Mckeon, heart failure navigator at [phone].

## 2024-10-10 NOTE — CONSULTS
Inpatient consult to Cardiology  Consult performed by: WINSTON Corbin  Consult ordered by: WINSTON Martinez  Reason for consult: heart failure      Cardiology Consult Note      Date:   10/10/2024  Patient name:  Ger Roberts  Date of admission:  10/9/2024 12:19 PM  MRN:   [id number]  YOB: 1975  Time of Consult:  7:21 AM    Consulting Cardiologist: Dr SEBASTIEN Oliveira/   SYED Lomax CNP  Primary Cardiologist:   Dr Higgins at [facility]     Referring Provider: Dr Baker      Admission Diagnosis:     Hypervolemia      History of Present Illness:      Ger Roberts is a 48 y.o.  male patient who is being at the request of Dr. Baker for inpatient consultation of CHF. He was admitted on 10/9/2024.  EMR and [facility] records have been reviewed in detail.      Patient presented to [facility] emergency department on 10/9/2024 with chief complaint of lower extremity edema, orthopnea, and abdominal fullness.  Patient describes that he had COVID in 2020 and was diagnosed with atrial fibrillation at that time.  He also has history of chronic diastolic heart failure, severe sleep apnea and had a tracheostomy placed 18 years ago.  His cardiologist is Dr. Anthony in [place], he describes that approximately in August 2024 he could tell that he was back in atrial fibrillation as he has shortness of breath with activities, a funny feeling in his chest, and somewhat lightheaded when he is out of rhythm.  He was placed on anticoagulation with Eliquis and then several weeks later had attempted cardioversion which she states was unsuccessful.  On his last cardiology follow-up at the end of September he was referred to Dr. Cordova for EP management of his atrial fibrillation and possible initiation of antiarrhythmic medication.  Patient states that since that time and even before he has been having more lower extremity swelling, abdominal  fullness, and has had to sleep in a chair.  He denies recent fevers, chills, cough.  Does wear oxygen at night via his trach.  States that sometimes he also feels that when he wakes up during the night has weakness in his arms that is transient and improves after several seconds.  He has no nausea or vomiting but describes chronic issues with diarrhea.  He is non-smoker but uses chewing tobacco since he was a teenager.  He denies alcohol or drug use.  He works in a factory.  Patient states does not weigh himself at home.    Labs and testing since presentation coronavirus not detected.  White count 7.1, hemoglobin 15.5 with hematocrit 50.8, magnesium 1.49, potassium 4.1, creatinine 0.8 GFR greater than 90.  Lactate 1.3, .  High-sensitivity troponin 10 with repeat 12.  EKG showed atrial fibrillation.  Chest x-ray showed cardiomegaly and irregular interstitial prominence greatest in the lung bases.  CT chest showed no evidence of pulmonary embolism or acute thoracic aortic pathology.  Diffuse bilateral groundglass opacities.  Weight on admission 156 kg (345 pounds)    He was treated with furosemide 40 mg IV, 2 g IV magnesium.  He is admitted to hospitalist service and cardiology consult was placed for evaluation of heart failure.     Past medical history  Morbid obesity  Chronic diastolic heart failure  Long-term anticoagulation  Diabetes, type II  Hypertension  Paroxysmal atrial fibrillation, patient states initially diagnosed in 2020 at time of COVID infection  Severe obstructive sleep apnea  Tobacco use  Tracheostomy  History of COVID  History of cardioversion September 2024 which patient states was unsuccessful     Past cardiac testing from outside hospital  6/23/2022 transthoracic echocardiogram  Moderate concentric LVH  EF 70%  Mild right ventricular dilation with normal right ventricular function  Normal diastolic function for age  Trivial tricuspid regurgitation, RVSP 32 mmHg    Event monitor 6/23/2022  essentially benign 14-day event monitor predominant rhythm normal sinus rhythm with rare premature atrial contractions and premature ventricular contractions    Allergies:     No Known Allergies      Past Medical History:     Past Medical History:   Diagnosis Date    COPD (chronic obstructive pulmonary disease) (Multi)        Past Surgical History:     Cardioversion 9/19/2024  RADHA on 4/26/2022  Right knee surgery  Tracheostomy    Family History:     Asthma: Mother and brother  COPD: Mother  Diabetes mellitus type 2: Father  Hypertension: Father and brother  Hyperthyroidism: Father  CVA and coronary disease: Father    Social History:     Social History     Tobacco Use    Smoking status: Unknown     Passive exposure: Never    Smokeless tobacco: Former     Types: Chew   Vaping Use    Vaping status: Never Used   Substance Use Topics    Alcohol use: Defer    Drug use: Defer       CURRENT INPATIENT MEDICATIONS    apixaban, 5 mg, oral, BID  furosemide, 40 mg, intravenous, BID  glipiZIDE XL, 20 mg, oral, Daily  insulin lispro, 0-10 Units, subcutaneous, Before meals & nightly  lisinopril, 40 mg, oral, Daily  magnesium oxide, 400 mg, oral, Daily  metoprolol tartrate, 100 mg, oral, BID  polyethylene glycol, 17 g, oral, Daily      oxygen, , Last Rate: 8 L/min (10/09/24 1326)      Current Outpatient Medications   Medication Instructions    Eliquis 5 mg, oral, 2 times daily    glipiZIDE XL (GLUCOTROL XL) 20 mg, oral, Daily    Lasix 20 mg, oral, Daily    lisinopril 40 mg, oral, Daily    metoprolol tartrate (LOPRESSOR) 100 mg, oral, 2 times daily    Mounjaro 15 mg/0.5 mL pen injector subcutaneous        Review of Systems:      12 point review of systems was obtained in detail and is negative other than that detailed above.    Vital Signs:     Vitals:    10/09/24 2112 10/09/24 2159 10/09/24 2359 10/10/24 0414   BP: (!) 183/88 (!) 135/96 129/81 126/72   BP Location: Right arm  Left arm    Patient Position: Lying  Sitting    Pulse: 94  97 98 86   Resp: 18  18    Temp:  36.5 °C (97.7 °F) 36.2 °C (97.2 °F) 36.6 °C (97.9 °F)   TempSrc:   Temporal    SpO2: 96% 93% 91% 90%   Weight:       Height:           Intake/Output Summary (Last 24 hours) at 10/10/2024 0721  Last data filed at 10/10/2024 0414  Gross per 24 hour   Intake 50 ml   Output 1825 ml   Net -1775 ml       Wt Readings from Last 4 Encounters:   10/09/24 (!) 156 kg (345 lb)       Physical Examination:     GENERAL APPEARANCE: Obese male in no acute distress.  He is sitting in chair at the bedside  CHEST: Symmetric and non-tender.  INTEGUMENT: Skin warm and dry, without gross excoriationis or lesions.  HEENT: No gross abnormalities of conjunctiva, teeth, gums, oral mucosa  NECK: Supple, no JVD, no bruit. Thyroid not palpable. Carotid upstrokes normal.  Tracheostomy in place  NEURO/PSHCY: Alert and oriented x3; appropriate behavior and responses. Moves all extremities equally.  LUNGS: Normal respiratory effort, breath sounds with scattered rhonchi in posterior lung fields.  HEART: Irregularly irregular, no murmur.   ABDOMEN: Obese, soft, nontender, positive bowel sounds   MUSCULOSKELETAL: Normal range of motion.  EXTREMITIES: 1+ bilateral lower extremity edema left greater than right.  PERIPHERAL VASCULAR: 2+ radial pulse and feet warm to touch bilateral.     Lab:     CBC:   Results from last 7 days   Lab Units 10/10/24  0433 10/09/24  1326   WBC AUTO x10*3/uL 7.7 7.1   RBC AUTO x10*6/uL 5.52 5.74   HEMOGLOBIN g/dL 14.8 15.5   HEMATOCRIT % 48.6 50.8   MCV fL 88 89   MCH pg 26.8 27.0   MCHC g/dL 30.5* 30.5*   RDW % 15.6* 15.7*   PLATELETS AUTO x10*3/uL 185 168     CMP:    Results from last 7 days   Lab Units 10/10/24  0433 10/09/24  1326   SODIUM mmol/L 136 137   POTASSIUM mmol/L 3.9 4.1   CHLORIDE mmol/L 97* 100   CO2 mmol/L 33* 30   BUN mg/dL 21 22   CREATININE mg/dL 0.94 0.98   GLUCOSE mg/dL 122* 124*   PROTEIN TOTAL g/dL  --  7.0   CALCIUM mg/dL 9.0 9.4   BILIRUBIN TOTAL mg/dL  --  1.1   ALK  PHOS U/L  --  41   AST U/L  --  16   ALT U/L  --  13     BMP:    Results from last 7 days   Lab Units 10/10/24  0433 10/09/24  1326   SODIUM mmol/L 136 137   POTASSIUM mmol/L 3.9 4.1   CHLORIDE mmol/L 97* 100   CO2 mmol/L 33* 30   BUN mg/dL 21 22   CREATININE mg/dL 0.94 0.98   CALCIUM mg/dL 9.0 9.4   GLUCOSE mg/dL 122* 124*     Magnesium:  Results from last 7 days   Lab Units 10/09/24  1326   MAGNESIUM mg/dL 1.49*     Troponin:    Results from last 7 days   Lab Units 10/09/24  1543 10/09/24  1326   TROPHS ng/L 12 10     BNP:   Results from last 7 days   Lab Units 10/09/24  1326   BNP pg/mL 312*     Lipid Panel:         Diagnostic Studies:   @No results found for this or any previous visit.    CT angio chest for pulmonary embolism    Result Date: 10/9/2024  STUDY: CT Angiogram of the Chest; 10/09/2024 5:26 pm INDICATION: Tachycardia.  Hypoxia.  Exertional shortness of breath. COMPARISON: None Available. ACCESSION NUMBER(S): [id number] ORDERING CLINICIAN: ERICK MEJIA TECHNIQUE:  CTA of the chest was performed with intravenous contrast. Images are reviewed and processed at a workstation according to the CT angiogram protocol with 3-D and/or MIP post processing imaging generated.  Omnipaque 350, 75 mL was administered intravenously. Automated mA/kV exposure control was utilized and patient examination was performed in strict accordance with principles of ALARA. FINDINGS: There is a endotracheal tube with tip projecting above the april. Pulmonary arteries are adequately opacified without acute or chronic filling defects.  The thoracic aorta is normal in course and caliber without dissection or aneurysm. The heart is normal in size without pericardial effusion.  Thoracic lymph nodes are not enlarged. There is no pleural effusion, pleural thickening, or pneumothorax. The airways are patent. There are diffuse bilateral groundglass opacities which may be infectious or inflammatory. No pulmonary consolidation.. Upper  abdomen demonstrates no acute pathology. There are no acute fractures.  No suspicious bony lesions.    Endotracheal tube with tip projecting above the april. No evidence of pulmonary embolism or acute thoracic aortic pathology. Diffuse bilateral groundglass opacities which may be infectious or inflammatory. No pulmonary consolidation. Signed by Delmar Buck MD    ECG 12 lead    Result Date: 10/9/2024  Atrial fibrillation Left axis deviation Septal infarct (cited on or before 09-OCT-2024) Abnormal ECG When compared with ECG of 09-OCT-2024 12:10, (unconfirmed) No significant change was found See ED provider note for full interpretation and clinical correlation Confirmed by Deb Davila ([id number]) on 10/9/2024 5:27:02 PM    ECG 12 lead    Result Date: 10/9/2024  Atrial fibrillation Left axis deviation Septal infarct , age undetermined Abnormal ECG No previous ECGs available See ED provider note for full interpretation and clinical correlation Confirmed by Deb Davila ([id number]) on 10/9/2024 5:24:18 PM    XR chest 1 view    Result Date: 10/9/2024  Interpreted By:  Yonas Toribio, STUDY: XR CHEST 1 VIEW;  10/9/2024 12:53 pm   INDICATION: Signs/Symptoms:Shortness of breath, palpitations.     COMPARISON: None.   ACCESSION NUMBER(S): [id number]   ORDERING CLINICIAN: ERICK MEJIA   FINDINGS: CARDIOMEDIASTINAL SILHOUETTE: Tracheostomy catheter tip projects proximally 8 cm above the april level. Cardiac silhouette is enlarged.   LUNGS: Irregular interstitial prominence is greatest toward the lung bases. Small pleural effusions not excluded. No appreciable pneumothorax.   ABDOMEN: No remarkable upper abdominal findings.   BONES: No acute osseous changes.       1.  Cardiomegaly. 2. Irregular interstitial prominence greatest toward the lung bases may represent vascular congestion/edema. Infection not excluded.       MACRO: None.   Signed by: Yonas Toribio 10/9/2024 12:58 PM Dictation workstation:    [id number]      No echocardiogram results found for the past 14 days    Radiology:     CT angio chest for pulmonary embolism   Final Result   Endotracheal tube with tip projecting above the april.   No evidence of pulmonary embolism or acute thoracic aortic pathology.   Diffuse bilateral groundglass opacities which may be infectious or   inflammatory. No pulmonary consolidation.   Signed by Delmar Buck MD      XR chest 1 view   Final Result   1.  Cardiomegaly.   2. Irregular interstitial prominence greatest toward the lung bases   may represent vascular congestion/edema. Infection not excluded.                  MACRO:   None.        Signed by: Yonas Toribio 10/9/2024 12:58 PM   Dictation workstation:   [id number]      Transthoracic Echo (TTE) Complete    (Results Pending)       Problem List:     Patient Active Problem List   Diagnosis    Hypervolemia       Assessment:     Acute on chronic heart failure with preserved ejection fraction   Persistent atrial fibrillation  Hypomagnesia on admit, improved with replacement   Anticoagulation with Eliquis  Hypertension  Morbid obesity  Severe sleep apnea requiring tracheostomy  Type 2 diabetes mellitus  Chronic use of chewing tobacco  Plan:     Continue telemetry  Strict intake output  Daily weight  Monitor electrolytes, renal function  Reviewed transthoracic echocardiogram that was performed this morning  Continue diuresis with IV Lasix  Continue anticoagulation with Eliquis  Optimize heart failure GDMT, discontinue lisinopril and change to losartan for now.  Add Jardiance  Consult RN heart failure navigator    Jania Pratt CNP  [facility]    CARDIOLOGIST NOTE  I have personally seen and examined patient as well as personally formulated treatment plan.  I have reviewed this note as created by Jania Pratt RN, CNP and agree with her findings and physical exam.    48-year-old man with morbid obesity sleep apnea  treated with tracheostomy for which uses oxygen at night.  Does not need CPAP.  He has not had cardiac events despite risk factors thereof.  He has had a stress test but he cannot recall when.  He was initially diagnosed with atrial fibrillation during COVID in 2020 was started on sotalol at that time.  He was very compliant with the sotalol for several years but about a year ago his finances were a problem and he stopped the sotalol.  Atrial fibrillation recurred in July of this year.  He had cardioversion in September which was not successful.  Presents at this time with increasing shortness of breath lower extremity edema PND and orthopnea.  Just prior to August when the A-fib clearly was back he was able to move furniture for one of his relatives and move boxes out of the house without chest tightness pressure heaviness or shortness of breath.  He has not had prior history myocardial infarction stroke or symptoms of peripheral vascular disease.  He does not smoke cigarettes or drink alcohol.  He does not abuse drugs.    Exam: Morbidly obese comfortable lungs are clear cardiac exam is an irregular rhythm he has 2-3+ edema at this time abdomen is obese soft nontender lungs are clear elevation jugular venous distention    ECG: Atrial fibrillation rapid ventricular response no acute ST change  Echocardiogram:   1. The left ventricular systolic function is normal, with a visually estimated ejection fraction of 70%.   2. Spectral Doppler shows an abnormal pattern of left ventricular diastolic filling.   3. There is moderate concentric left ventricular hypertrophy.   4. There is normal right ventricular global systolic function.   5. RVSP 33 mmHg.   6. The left atrium is mild to moderately dilated.   7. Slightly elevated right ventricular systolic pressure.   8. Normal valve structure and function.    Impression  Recurrent atrial fibrillation unsuccessful cardioversion just last month.  He had been on sotalol which  successfully maintained sinus in the past.  ECG shows no prolongation of QT he has normal renal function he has no contraindications for sotalol and would resume that at 120 twice daily monitoring ECGs for prolongation of QT  Will consult electrophysiology as at his age he may well benefit from PVI ablation though his morbid obesity makes recurrence somewhat more likely.  He does have history of sleep apnea but now with tracheostomy he does not have hypoxia or hypercapnic events.  Risk for coronary disease: He should have a lipid profile.  He is at high risk of coronary disease though has no symptoms to suggest this.  Despite rapid atrial fibrillation CHF troponins remain negative and there are no ischemic ST changes.  Check lipids and institute statin therapy  Hypertension: Fair control adjust medications as needed  Acute on chronic diastolic CHF: Control blood pressure add Jardiance control atrial fibrillation as described above.  Would plan on direct-current cardioversion after 3 to 4 days on sotalol if remains in atrial fibrillation.  Risk of drug: Discussed need of hospitalization for the next 3 days for sotalol.  Will plan ECG before and after each dose monitoring for QTc prolongation importantly renal function is normal    Gunnar Oliveira MD              Of note, this documentation is completed using the Dragon Dictation system (voice recognition software). There may be spelling and/or grammatical errors that were not corrected prior to final submission.      Electronically signed by WINSTON Corbin, on 10/10/2024 at 7:21 AM

## 2024-10-10 NOTE — H&P (VIEW-ONLY)
Inpatient consult to cardiology  Consult performed by: Micha Cordova MD  Consult ordered by: SYED Corbin-CNP  Reason for consult: Atrial fibrillation management  Assessment/Recommendations: Patient was seen, chart reviewed.    Patient was admitted for shortness of breath diastolic heart failure and also atrial fibrillation.  Agree with cardiology service.  Patient will start sotalol 120 mg 1 tablet twice a day.    Continue telemetry  EKG daily  Continue anticoagulant therapy with Eliquis.  Possible cardioversion in a.m. tomorrow  Procedure, risk, benefits and possible complications were explained to patient.  All questions were answered.  Patient agrees with plan.  Informed consent was signed.    Risk factor modification and lifestyle modification discussed with patient. Diet , exercise and hydration discussed with patient.    Please excuse any errors in grammar or translation related to this dictation.  Voice recognition software was utilized to prepare this document.          History Of Present Illness:    Ger Roberts is a 48 y.o. male presenting with palpitations.    Past medical history diabetes mellitus severe obstructive sleep apnea status post T-tube, history of atrial fibrillation.  Patient presented to the hospital complaining of shortness of breath.  Apparently he has been using more diuretic therapy recently and also palpitations.    Patient states that he was diagnosed of atrial fibrillation at least 5 years ago.  He was placed on sotalol therapy    Due to financial reasons, he discontinued this medication.  He is basically recently seen at [facility] for management for atrial fibrillation.  He was cardioverted with evidence of recurrence of atrial fibrillation.  There was a discussion about putting him on antiarrhythmic therapy but patient lost follow-up with [facility].  Echocardiogram in June 2022 shows left intervention fraction 70% with trivial to respiratory rotation.  Event  monitor 2022 shows predominant rhythm sinus rhythm.  No evidence of atrial fibrillation.  No evidence of bradycardia or pauses.    EKG on arrival shows atrial fibrillation rate of 99 bpm QRS duration 86 ms QT corrected 444 ms.  Laboratory data on admission shows sodium 136 potassium 3.9 BUN 21 creatinine 0.94 GFR more than 90 LDL 64 magnesium 1.181 WBC 7.7 hemoglobin 14.8 hematocrit 40.6 platelet count 185.    Echocardiogram October 2024  CONCLUSIONS:   1. The left ventricular systolic function is normal, with a visually estimated ejection fraction of 70%.   2. Spectral Doppler shows an abnormal pattern of left ventricular diastolic filling.   3. There is moderate concentric left ventricular hypertrophy.   4. There is normal right ventricular global systolic function.   5. RVSP 33 mmHg.   6. The left atrium is mild to moderately dilated.   7. Slightly elevated right ventricular systolic pressure.   8. Normal valve structure and function.        Last Recorded Vitals:  Vitals:    10/10/24 0414 10/10/24 0916 10/10/24 1013 10/10/24 1113   BP: 126/72 142/87  123/89   BP Location:    Right arm   Patient Position:  Sitting  Sitting   Pulse: 86 93  91   Resp:  (!) 36  14   Temp: 36.6 °C (97.9 °F) 36.4 °C (97.5 °F)  36.4 °C (97.5 °F)   TempSrc:    Temporal   SpO2: 90% 94%  94%   Weight:   (!) 155 kg (342 lb 13 oz)    Height:           Last Labs:  CBC - 10/10/2024:  4:33 AM  7.7 14.8 185    48.6      CMP - 10/10/2024:  4:33 AM  9.0 7.0 16 --- 1.1   _ 4.1 13 41      PTT - No results in last year.  1.5   16.9 _     Troponin I, High Sensitivity   Date/Time Value Ref Range Status   10/09/2024 03:43 PM 12 0 - 20 ng/L Final   10/09/2024 01:26 PM 10 0 - 20 ng/L Final     BNP   Date/Time Value Ref Range Status   10/09/2024 01:26  (H) 0 - 99 pg/mL Final     LDL Calculated   Date/Time Value Ref Range Status   10/10/2024 04:33 AM 64 <=99 mg/dL Final     Comment:                                 Near   Borderline      AGE       Desirable  Optimal    High     High     Very High     0-19 Y     0 - 109     ---    110-129   >/= 130     ----    20-24 Y     0 - 119     ---    120-159   >/= 160     ----      >24 Y     0 -  99   100-129  130-159   160-189     >/=190       VLDL   Date/Time Value Ref Range Status   10/10/2024 04:33 AM 23 0 - 40 mg/dL Final      Last I/O:  I/O last 3 completed shifts:  In: 50 (0.3 mL/kg) [I.V.:50 (0.3 mL/kg)]  Out: 1825 (11.7 mL/kg) [Urine:1825 (0.3 mL/kg/hr)]  Weight: 156.5 kg     Past Cardiology Tests (Last 3 Years):  EKG:  ECG 12 lead 10/10/2024 (Preliminary)      ECG 12 Lead 10/10/2024 (Preliminary)      ECG 12 lead 10/09/2024      ECG 12 lead 10/09/2024    Echo:  Transthoracic Echo (TTE) Complete 10/10/2024    Ejection Fractions:  EF   Date/Time Value Ref Range Status   10/10/2024 09:20 AM 70 %      Cath:  No results found for this or any previous visit from the past 1095 days.    Stress Test:  No results found for this or any previous visit from the past 1095 days.    Cardiac Imaging:  No results found for this or any previous visit from the past 1095 days.      Past Medical History:  He has a past medical history of COPD (chronic obstructive pulmonary disease) (Multi).    Past Surgical History:  He has no past surgical history on file.      Social History:  He reports that he has an unknown smoking status. He has never been exposed to tobacco smoke. He has quit using smokeless tobacco.  His smokeless tobacco use included chew. Alcohol use questions deferred to the physician. Drug use questions deferred to the physician.    Family History:  No family history on file.     Allergies:  Patient has no known allergies.    Inpatient Medications:  Scheduled medications   Medication Dose Route Frequency    apixaban  5 mg oral BID    empagliflozin  10 mg oral Daily    furosemide  40 mg intravenous BID    glipiZIDE XL  20 mg oral Daily    insulin lispro  0-10 Units subcutaneous Before meals & nightly    losartan  50 mg  oral Daily    magnesium oxide  400 mg oral Daily    [START ON 10/11/2024] metoprolol tartrate  75 mg oral BID    polyethylene glycol  17 g oral Daily    sotalol  120 mg oral BID     PRN medications   Medication    acetaminophen    Or    acetaminophen    Or    acetaminophen    dextrose    dextrose    glucagon    glucagon     Continuous Medications   Medication Dose Last Rate    oxygen   8 L/min (10/09/24 1326)     Outpatient Medications:  Current Outpatient Medications   Medication Instructions    Eliquis 5 mg, oral, 2 times daily    glipiZIDE XL (GLUCOTROL XL) 20 mg, oral, Daily    Lasix 20 mg, oral, Daily    lisinopril 40 mg, oral, Daily    metoprolol tartrate (LOPRESSOR) 100 mg, oral, 2 times daily    Mounjaro 15 mg/0.5 mL pen injector subcutaneous       Physical Exam:  Vitals reviewed.   Constitutional:       General: He is not in acute distress.     Appearance: He is obese. He is not ill-appearing.   HENT:      Head: Normocephalic and atraumatic.      Mouth/Throat:      Comments: Tracheostomy present.  Eyes:      Extraocular Movements: Extraocular movements intact.      Conjunctiva/sclera: Conjunctivae normal.   Cardiovascular:      Rate and Rhythm: Normal rate and regular rhythm.      Pulses: Normal pulses.      Heart sounds: Normal heart sounds.   Pulmonary:      Effort: Pulmonary effort is normal.      Comments: No acute respiratory distress.  Breath sounds diminished auscultation bilaterally.  Abdominal:      General: Bowel sounds are normal. There is no distension.      Palpations: Abdomen is soft.      Tenderness: There is no abdominal tenderness. There is no guarding.   Musculoskeletal:         General: No tenderness. Normal range of motion.      Cervical back: Normal range of motion and neck supple.      Right lower leg: Edema present.      Left lower leg: Edema present.   Neurological:      General: No focal deficit present.      Mental Status: He is alert and oriented to person, place, and time. Mental  status is at baseline.   Psychiatric:         Mood and Affect: Mood normal.         Behavior: Behavior normal.      Assessment/Plan   Acute on chronic heart failure with preserved ejection fraction   Persistent atrial fibrillation  Hypomagnesia on admit, improved with replacement   Anticoagulation with Eliquis  Hypertension  Morbid obesity  Severe sleep apnea requiring tracheostomy  Type 2 diabetes mellitus  Chronic use of chewing tobacco    Peripheral IV 10/09/24 20 G Left Antecubital (Active)   Site Assessment Clean;Dry;Intact 10/10/24 0900   Dressing Status Clean;Dry 10/10/24 0900   Number of days: 1       Surgical Airway Shiley 6 (Active)   Site Assessment Clean;Dry 10/10/24 1452   Number of days:        Code Status:  Full Code    I spent 60 minutes in the professional and overall care of this patient.        Micha Cordova MD

## 2024-10-10 NOTE — CONSULTS
Heart Failure Nurse Navigator    The role of the HF nurse navigator is to (1) characterize risk profiles of patients with heart failure transitioning from [id number] after hospitalization, (2) recommend interventions to improve care and reduce risks of worse post-hospitalization outcomes. Potential recommendations may touch base on patient/family education, additional consults that may bring value, and appointment scheduling.    Assessment    I met with Ger Roberts at the bedside, and my impressions include: Met with patient at bedside for heart failure education. Patient verbalized understanding. Patient lives at home alone. His cardiologist was Dr. Higgins in [place] but his plan is to see someone at [facility] going forward because Dr. Higgins switched to [place]. His PCP is Dr. Jody Solis in [place]. He is compliant with all his medications and follow up appointments. He is not a smoker but does chew tobacco. Advised cessation of chewing tobacco. He has a trach. He states he does have oxygen for his trach which he mostly uses at night. Answered all questions. Provided my contact information should any other questions or concerns arise.             Patients Cardiologist(s): [facility]    Patients Primary Care Provider: Dr. Jody Solis     1. Medical Domain  What is the patient's most recent LVEF? 70%  HFrEF (LVEF <= 40%) Quadruple therapy recommended  HFmrEF (LVEF 41-49%) Quadruple therapy recommended  HFpEF (LVEF >= 50%) Minimum recommendations: SGLT2i and MRA  Is the patient on GDMT for their condition?   ARNI/ACEI/ARB: Yes  BB: Yes  MRA: Yes  SGLT2i: No  Could this patient have advanced heart failure (Stage D heart failure)?:  No  If yes, the potential markers of advanced heart failure include: N/A    REFERENCE: Potential markers of advanced HF   Inotrope (dobutamine or milrinone) used during this admission?   LVEF<=25%?   >=2 hospitalizations for ADHF in the last year?   Severe symptoms  "of HF (fatigue, dyspnea, confusion, edema) despite medical therapy?   Downtitration of GDMT as compared to home medications?   Discontinuation of GDMT because of hypotension or renal intolerance?   Recurrent arrhythmias (AF, VT with ICD shocks)?   Cardiac cachexia (i.e., unintentional weight loss due to HF)?   High-risk biomarker profile (e.g., hyponatremia [Na<135], very elevated BNP, worsening kidney function)   Escalating doses of diuretics or persistent edema despite escalation     2. Mind and Emotion  Does this patient have possible cognitive impairment?: N/A   Ask the patient to memorize these 3 words: banana, sunrise, chair  Ask the patient to draw a clock with hands pointing at \"20 minutes after 8\"  Ask the patient to recall the 3 words  Score: Add number of words recalled + clock drawing (0 points for any errors, 2 points if correct)  Interpretation: A score of 0-2 suggests cognitive impairment is present, a score of 3-5 suggests cognitive impairment is absent  Does this patient have major depression?: No (PH-Q2 score 0/6)  Over the last 2 weeks: Little interest or pleasure in doing things? (Not at all +0, Several days +1, More than half the days +2, Nearly every day +3)  Over the last 2 weeks: Feeling down, depressed or hopeless? (Not at all +0, Several days +1, More than half the days +2, Nearly every day +3)  Score: Add points  Interpretation: A score of 3 or more suggests that major depression is likely.     3. Physical Function  Could this patient be frail?: No   Defined by presence of all of these: slowness, weakness, shrinking, inactivity, exhaustion  Is this patient at risk for falling?: No   Defined by having experienced a fall in the last 12 months.    4. Social Determinants of Health  Transportation deficits?: No   Lack of insurance?: No   Poor financial resources?: No   Living conditions (homelessness, unstable home)?: No   Poor family/social support?: No   Poor personal care?: No   Food " insecurity?: No   Lack of HCPOA?: N/A    Summary of Assessment  The following linked problems were found: None at this time. Patient has no barriers in obtaining his medications or getting to follow up appointments.         Current Medications:  Beta blocker: Metoprolol tartrate 100 mg BID  ACE inhibitor/ARB/ARNI: Losartan 50 mg daily  MRA: None  SGLT2i:  empagliflozin 10 mg daily  Diuretic: Furosemide 40 mg BID    Device none    Recommendations  Recommend scheduling an appointment with a heart failure cardiologist post-discharge (Patient is not on complete regimen of GDMT for heart failure).    Heart Failure Education   - Living With Heart Failure packet provided.  - CHF signs and symptoms, heart failure zones and when to call cardiologist.   - Controlling Heart Failure at Home: medication adherence, following up with cardiologist at least once yearly, staying healthy and active, limiting sodium and fluid intake as directed by cardiologist.  - Daily Weight Education        Eri Mckeon RN  [phone]

## 2024-10-10 NOTE — PROGRESS NOTES
Medical Group Progress Note  ASSESSMENT & PLAN:     Acute on chronic diastolic CHF exacerbation  - Presented from home with multiple weeks to months of dyspnea especially with exertion and paroxysmal nocturnal dyspnea  - Also having worsening lower extremity edema  - Echo today showing EF of 70% with impaired diastolic filling  - Cardiology following  - Switched to losartan today  - Continuing home metoprolol  - Diuresing with Lasix 4 mg IV twice daily  - Strict I's and O's, sodium restriction diet, daily weight  - Heart failure navigator  - Initiated on Jardiance as well    Paroxysmal atrial fibrillation  Long-term anticoagulation use  - Continue home metoprolol and apixaban  - Initiating sotalol  - Cardiology following  - EP consulted    Morbid obesity  Obesity hypoventilation syndrome  Obstructive sleep apnea  - Patient with tracheostomy, uses trach collar as needed    Type II DM  - Insulin correction factor ACHS       VTE Prophylaxis: Home apixaban    Disposition/Daily update: Patient diuresing well from a CHF standpoint, echo reviewed from today.  Spoke with cardiology, will be consulting EP and initiating sotalol.  Patient will require admission for QT monitoring at least for the next 48 to 72 hours.      ---Of note, this documentation is completed using the Dragon Dictation system (voice recognition software). There may be spelling and/or grammatical errors that were not corrected prior to final submission.---      Trevor Baker MD    SUBJECTIVE     Patient was seen and examined bedside this morning.  States that shortness of breath is improved slightly, has been urinating quite a bit with the diuresis.    OBJECTIVE:     Last Recorded Vitals:  Vitals:    10/10/24 0414 10/10/24 0916 10/10/24 1013 10/10/24 1113   BP: 126/72 142/87  123/89   BP Location:    Right arm   Patient Position:  Sitting  Sitting   Pulse: 86 93  91   Resp:  (!) 36  14   Temp: 36.6 °C (97.9 °F) 36.4 °C (97.5 °F)  36.4 °C (97.5 °F)    TempSrc:    Temporal   SpO2: 90% 94%  94%   Weight:   (!) 155 kg (342 lb 13 oz)    Height:         Last I/O:  I/O last 3 completed shifts:  In: 50 (0.3 mL/kg) [I.V.:50 (0.3 mL/kg)]  Out: 1825 (11.7 mL/kg) [Urine:1825 (0.3 mL/kg/hr)]  Weight: 156.5 kg     Physical Exam  Vitals reviewed.   Constitutional:       General: He is not in acute distress.     Appearance: He is obese. He is not ill-appearing.   HENT:      Head: Normocephalic and atraumatic.      Mouth/Throat:      Comments: Tracheostomy present.  Eyes:      Extraocular Movements: Extraocular movements intact.      Conjunctiva/sclera: Conjunctivae normal.   Cardiovascular:      Rate and Rhythm: Normal rate and regular rhythm.      Pulses: Normal pulses.      Heart sounds: Normal heart sounds.   Pulmonary:      Effort: Pulmonary effort is normal.      Comments: No acute respiratory distress.  Breath sounds diminished auscultation bilaterally.  Abdominal:      General: Bowel sounds are normal. There is no distension.      Palpations: Abdomen is soft.      Tenderness: There is no abdominal tenderness. There is no guarding.   Musculoskeletal:         General: No tenderness. Normal range of motion.      Cervical back: Normal range of motion and neck supple.      Right lower leg: Edema present.      Left lower leg: Edema present.   Neurological:      General: No focal deficit present.      Mental Status: He is alert and oriented to person, place, and time. Mental status is at baseline.   Psychiatric:         Mood and Affect: Mood normal.         Behavior: Behavior normal.       Inpatient Medications:  apixaban, 5 mg, oral, BID  empagliflozin, 10 mg, oral, Daily  furosemide, 40 mg, intravenous, BID  glipiZIDE XL, 20 mg, oral, Daily  insulin lispro, 0-10 Units, subcutaneous, Before meals & nightly  losartan, 50 mg, oral, Daily  magnesium oxide, 400 mg, oral, Daily  [START ON 10/11/2024] metoprolol tartrate, 75 mg, oral, BID  polyethylene glycol, 17 g, oral,  Daily  sotalol, 120 mg, oral, BID    PRN Medications  PRN medications: acetaminophen **OR** acetaminophen **OR** acetaminophen, dextrose, dextrose, glucagon, glucagon  Continuous Medications:  oxygen, , Last Rate: 8 L/min (10/09/24 1326)      LABS AND IMAGING:     Labs:  Results from last 7 days   Lab Units 10/10/24  0433 10/09/24  1326   WBC AUTO x10*3/uL 7.7 7.1   RBC AUTO x10*6/uL 5.52 5.74   HEMOGLOBIN g/dL 14.8 15.5   HEMATOCRIT % 48.6 50.8   MCV fL 88 89   MCH pg 26.8 27.0   MCHC g/dL 30.5* 30.5*   RDW % 15.6* 15.7*   PLATELETS AUTO x10*3/uL 185 168     Results from last 7 days   Lab Units 10/10/24  0433 10/09/24  1326   SODIUM mmol/L 136 137   POTASSIUM mmol/L 3.9 4.1   CHLORIDE mmol/L 97* 100   CO2 mmol/L 33* 30   BUN mg/dL 21 22   CREATININE mg/dL 0.94 0.98   GLUCOSE mg/dL 122* 124*   PROTEIN TOTAL g/dL  --  7.0   CALCIUM mg/dL 9.0 9.4   BILIRUBIN TOTAL mg/dL  --  1.1   ALK PHOS U/L  --  41   AST U/L  --  16   ALT U/L  --  13     Results from last 7 days   Lab Units 10/10/24  0433 10/09/24  1326   MAGNESIUM mg/dL 1.81 1.49*     Results from last 7 days   Lab Units 10/09/24  1543 10/09/24  1326   TROPHS ng/L 12 10     Imaging:  ECG 12 lead  Atrial fibrillation with premature ventricular or aberrantly conducted complexes  Left axis deviation  Abnormal ECG  When compared with ECG of 10-OCT-2024 12:43, (unconfirmed)  No significant change was found  ECG 12 Lead  Atrial fibrillation  Left axis deviation  Abnormal ECG  When compared with ECG of 09-OCT-2024 14:55,  No significant change was found  Transthoracic Echo (TTE) [place] [address]   Tel [phone] Fax [phone]    TRANSTHORACIC ECHOCARDIOGRAM REPORT    Patient Name:      MACEY SANTIAGO      [place] Physician:    [id number] Gunnar Oliveira MD, [facility]  Study Date:        10/10/2024           Ordering Provider:    [id number] NIXON MEDINA                                                                 NEY  MRN/PID:           [id number]             Fellow:  Accession#:        [id number]         Nurse:                Franky Blackwell RN  Date of Birth/Age: 1975 / 48 years Sonographer:          April Farooq RDCS  Gender:            M                    Additional Staff:  Height:            182.88 cm            Admit Date:           10/9/2024  Weight:            156.49 kg            Admission Status:     Inpatient -                                                                Routine  BSA / BMI:         2.69 m2 / 46.79      Department Location:  [place] [address]                                      Echo Lab  Blood Pressure: 126 /72 mmHg    Study Type:    TRANSTHORACIC ECHO (TTE) COMPLETE  Diagnosis/ICD: Unspecified atrial fibrillation-I48.91  Indication:    AF  CPT Codes:     Echo Complete w Full [id number]    Patient History:  Diabetes:          Yes  Pertinent History: A-Fib and Dyspnea.    Study Detail: The following Echo studies were performed: 2D, M-Mode, Doppler and                color flow. Technically challenging study due to body habitus and                a tracheostomy present. Definity used as a contrast agent for                endocardial border definition. Total contrast used for this                procedure was 2.5 mL via IV push.       PHYSICIAN INTERPRETATION:  Left Ventricle: The left ventricular systolic function is normal, with a visually estimated ejection fraction of 70%. There are no regional wall motion abnormalities. The left ventricular cavity size is normal. There is moderate concentric left ventricular hypertrophy. Spectral Doppler shows an abnormal pattern of left ventricular diastolic filling.  Left Atrium: The left atrium is mild to moderately dilated.  Right Ventricle: The right ventricle is normal in size. There is normal  right ventricular global systolic function. RVSP 33 mmHg.  Right Atrium: The right atrium is normal in size.  Aortic Valve: The aortic valve appears structurally normal. The aortic valve dimensionless index is 0.76. There is no evidence of aortic valve regurgitation. The peak instantaneous gradient of the aortic valve is 5.3 mmHg. The mean gradient of the aortic valve is 3.0 mmHg.  Mitral Valve: The mitral valve is normal in structure. There is no evidence of mitral valve regurgitation.  Tricuspid Valve: The tricuspid valve is structurally normal. There is mild tricuspid regurgitation. The Doppler estimated RVSP is slightly elevated right ventricular systolic pressure at 33.0 mmHg.  Pulmonic Valve: The pulmonic valve is structurally normal. There is no indication of pulmonic valve regurgitation.  Pericardium: Trivial pericardial effusion.  Aorta: The aortic root is normal.       CONCLUSIONS:   1. The left ventricular systolic function is normal, with a visually estimated ejection fraction of 70%.   2. Spectral Doppler shows an abnormal pattern of left ventricular diastolic filling.   3. There is moderate concentric left ventricular hypertrophy.   4. There is normal right ventricular global systolic function.   5. RVSP 33 mmHg.   6. The left atrium is mild to moderately dilated.   7. Slightly elevated right ventricular systolic pressure.   8. Normal valve structure and function.    QUANTITATIVE DATA SUMMARY:     2D MEASUREMENTS:           Normal Ranges:  Ao Root d:       2.50 cm   (2.0-3.7cm)  LAs:             4.50 cm   (2.7-4.0cm)  IVSd:            1.25 cm   (0.6-1.1cm)  LVPWd:           1.36 cm   (0.6-1.1cm)  LVIDd:           4.97 cm   (3.9-5.9cm)  LVIDs:           4.41 cm  LV Mass Index:   97.1 g/m2  LV % FS          11.3 %       LA VOLUME:                    Normal Ranges:  LA Vol A4C:        110.5 ml   (22+/-6mL/m2)  LA Vol A2C:        125.1 ml  LA Vol BP:         119.3 ml  LA Vol Index A4C:  41.2ml/m2  LA Vol  Index A2C:  46.6 ml/m2  LA Vol Index BP:   44.4 ml/m2  LA Area A4C:       30.6 cm2  LA Area A2C:       32.1 cm2  LA Major Axis A4C: 7.2 cm  LA Major Axis A2C: 7.0 cm  LA Volume Index:   41.6 ml/m2       RA VOLUME BY A/L METHOD:            Normal Ranges:  RA Vol A4C:              88.5 ml    (8.3-19.5ml)  RA Vol Index A4C:        32.9 ml/m2  RA Area A4C:             25.2 cm2  RA Major Axis A4C:       6.1 cm       AORTA MEASUREMENTS:         Normal Ranges:  Asc Ao, d:          3.70 cm (2.1-3.4cm)       LV SYSTOLIC FUNCTION BY 2D PLANIMETRY (MOD):                       Normal Ranges:  EF-A4C View:    32 % (>=55%)  EF-A2C View:    28 %  EF-Biplane:     29 %  EF-Visual:      70 %  LV EF Reported: 70 %       AORTIC VALVE:                     Normal Ranges:  AoV Vmax:                1.15 m/s (<=1.7m/s)  AoV Peak P.3 mmHg (<20mmHg)  AoV Mean PG:             3.0 mmHg (1.7-11.5mmHg)  LVOT Max Khoi:            0.93 m/s (<=1.1m/s)  AoV VTI:                 22.10 cm (18-25cm)  LVOT VTI:                16.80 cm  LVOT Diameter:           2.00 cm  (1.8-2.4cm)  AoV Area, VTI:           2.39 cm2 (2.5-5.5cm2)  AoV Area,Vmax:           2.53 cm2 (2.5-4.5cm2)  AoV Dimensionless Index: 0.76       RIGHT VENTRICLE:  RV Basal 4.10 cm  RV Mid   4.57 cm  RV Major 7.9 cm  TAPSE:   20.0 mm       TRICUSPID VALVE/RVSP:          Normal Ranges:  Peak TR Velocity:     2.74 m/s  RV Syst Pressure:     33 mmHg  (< 30mmHg)       PULMONIC VALVE:          Normal Ranges:  PV Accel Time:  114 msec (>120ms)  PV Max Khoi:     0.9 m/s  (0.6-0.9m/s)  PV Max PG:      3.1 mmHg       [id number] Gunnar Oliveira MD, FACC  Electronically signed on 10/10/2024 at 11:36:09 AM       ** Final **

## 2024-10-10 NOTE — H&P
"History Of Present Illness  Ger Roberts is a 48 y.o. male with a past medical history of T2DM, severe HERLINDA s/p t-tube, afib (on Eliquis), and BLE edema who presented to the ED with orthopnea, BLE edema, abdominal distension, and dyspnea on exertion for around 1 month. He reports his symptoms have been worsening, and he attempted to take additional lasix without much change in his urine output. He is prescribed 20mg daily, however reports taking 40mg twice daily, although admits he sometimes does not take it at night due to wanting to sleep. He notes he has to sleep sitting up in a chair as of recently. He denies any fever, chills, chest pain, nausea, or vomiting. He describes his abdomen as being \"tight\", but denies outright pain.      Past Medical History  Past Medical History:   Diagnosis Date    COPD (chronic obstructive pulmonary disease) (Multi)        Surgical History  History reviewed. No pertinent surgical history.     Social History  HeAlcohol use questions deferred to the physician. Drug use questions deferred to the physician. No history on file for tobacco use.    Family History  No family history on file.     Allergies  Patient has no known allergies.    Review of Systems   Constitutional:  Negative for chills and fever.   Respiratory:  Positive for shortness of breath.    Cardiovascular:  Positive for leg swelling. Negative for chest pain and palpitations.   Gastrointestinal:  Positive for abdominal distention. Negative for abdominal pain, constipation, diarrhea, nausea and vomiting.   Genitourinary:  Positive for decreased urine volume. Negative for dysuria, flank pain, frequency, hematuria and urgency.   All other systems reviewed and are negative.       Physical Exam  Vitals reviewed.   Constitutional:       Appearance: He is obese.   HENT:      Head: Normocephalic and atraumatic.   Neck:      Trachea: Tracheostomy present.   Cardiovascular:      Rate and Rhythm: Normal rate. Rhythm irregular.    "   Heart sounds: Normal heart sounds.   Pulmonary:      Effort: Pulmonary effort is normal.      Breath sounds: Normal air entry. Wheezing and rhonchi present.   Abdominal:      General: Bowel sounds are normal. There is distension.      Tenderness: There is no abdominal tenderness.   Musculoskeletal:         General: No deformity.      Right lower leg: Edema present.      Left lower leg: Edema present.   Skin:     General: Skin is warm and dry.   Neurological:      General: No focal deficit present.      Mental Status: He is alert and oriented to person, place, and time.   Psychiatric:         Mood and Affect: Mood normal.         Behavior: Behavior normal.          Last Recorded Vitals  Blood pressure (!) 164/99, pulse 95, temperature 36.6 °C (97.9 °F), temperature source Temporal, resp. rate 18, height 1.829 m (6'), weight (!) 156 kg (345 lb), SpO2 95%.    Relevant Results      Lab Results   Component Value Date    WBC 7.1 10/09/2024    HGB 15.5 10/09/2024    HCT 50.8 10/09/2024    MCV 89 10/09/2024     10/09/2024     Lab Results   Component Value Date    GLUCOSE 124 (H) 10/09/2024    CALCIUM 9.4 10/09/2024     10/09/2024    K 4.1 10/09/2024    CO2 30 10/09/2024     10/09/2024    BUN 22 10/09/2024    CREATININE 0.98 10/09/2024     CT angio chest for pulmonary embolism  Narrative: STUDY:  CT Angiogram of the Chest; 10/09/2024 5:26 pm  INDICATION:  Tachycardia.  Hypoxia.  Exertional shortness of breath.  COMPARISON:  None Available.  ACCESSION NUMBER(S):  [id number]  ORDERING CLINICIAN:  ERICK MEJIA  TECHNIQUE:  CTA of the chest was performed with intravenous contrast.   Images are reviewed and processed at a workstation according to the CT  angiogram protocol with 3-D and/or MIP post processing imaging  generated.  Omnipaque 350, 75 mL was administered intravenously.  Automated mA/kV exposure control was utilized and patient examination  was performed in strict accordance with principles of  COLTON.  FINDINGS:  There is a endotracheal tube with tip projecting above the april.  Pulmonary arteries are adequately opacified without acute or chronic  filling defects.  The thoracic aorta is normal in course and caliber  without dissection or aneurysm.  The heart is normal in size without pericardial effusion.  Thoracic  lymph nodes are not enlarged.  There is no pleural effusion, pleural thickening, or pneumothorax.   The airways are patent.  There are diffuse bilateral groundglass opacities which may be  infectious or inflammatory. No pulmonary consolidation..  Upper abdomen demonstrates no acute pathology.  There are no acute fractures.  No suspicious bony lesions.  Impression: Endotracheal tube with tip projecting above the april.  No evidence of pulmonary embolism or acute thoracic aortic pathology.  Diffuse bilateral groundglass opacities which may be infectious or  inflammatory. No pulmonary consolidation.  Signed by Delmar Buck MD  ECG 12 lead  Atrial fibrillation  Left axis deviation  Septal infarct (cited on or before 09-OCT-2024)  Abnormal ECG  When compared with ECG of 09-OCT-2024 12:10, (unconfirmed)  No significant change was found  See ED provider note for full interpretation and clinical correlation  Confirmed by Deb Davila ([id number]) on 10/9/2024 5:27:02 PM  ECG 12 lead  Atrial fibrillation  Left axis deviation  Septal infarct , age undetermined  Abnormal ECG  No previous ECGs available  See ED provider note for full interpretation and clinical correlation  Confirmed by Deb Davila ([id number]) on 10/9/2024 5:24:18 PM  XR chest 1 view  Narrative: Interpreted By:  Yonas Toribio,   STUDY:  XR CHEST 1 VIEW;  10/9/2024 12:53 pm      INDICATION:  Signs/Symptoms:Shortness of breath, palpitations.          COMPARISON:  None.      ACCESSION NUMBER(S):  [id number]      ORDERING CLINICIAN:  ERICK MEJIA      FINDINGS:  CARDIOMEDIASTINAL SILHOUETTE:  Tracheostomy catheter tip projects  proximally 8 cm above the april  level. Cardiac silhouette is enlarged.      LUNGS:  Irregular interstitial prominence is greatest toward the lung bases.  Small pleural effusions not excluded. No appreciable pneumothorax.      ABDOMEN:  No remarkable upper abdominal findings.      BONES:  No acute osseous changes.      Impression: 1.  Cardiomegaly.  2. Irregular interstitial prominence greatest toward the lung bases  may represent vascular congestion/edema. Infection not excluded.              MACRO:  None.      Signed by: Yonas Toribio 10/9/2024 12:58 PM  Dictation workstation:   [id number]    ED Medication Administration from 10/09/2024 1158 to 10/09/2024 2018         Date/Time Order Dose Route Action Action by     10/09/2024 1326 EDT oxygen (O2) therapy 8 L/min inhalation Start EJRAD Nelson     10/09/2024 1540 EDT magnesium oxide (Mag-Ox) tablet 400 mg 400 mg oral Given JERAD Nelson     10/09/2024 1719 EDT iohexol (OMNIPaque) 350 mg iodine/mL solution 75 mL 75 mL intravenous Given MALINA Thomson     10/09/2024 1949 EDT furosemide (Lasix) injection 40 mg 40 mg intravenous Given JERAD Nelson     10/09/2024 1956 EDT magnesium sulfate 2 g in sterile water for injection 50 mL 2 g intravenous New Bag JERAD Nelson               Assessment/Plan   Assessment & Plan  Hypervolemia      #Hypervolemia  #BLE edema  #Orthopnea  #Cardiomegaly  -No echo found, will obtain  -Suspect ADHF  -Consult cardiology  -Lasix 40mg IV BID for now  -Strict I&Os  -Will place on stepdown  -Telemetry    #Afib  -Rates controlled  -Continue home medications, Eliquis    #r/o Pneumonia  -CXR with possible congestion/edema  -CTA PE -ve for PE  -CT shows diffuse b/l GGO  -Afebrile, no leukocytosis  -Doubt PNA, no abx for now    #T2DM  -Regular diet  -Low SSI for now  -Hypoglycemic protocol    #HERLINDA  #s/p T tube  -Verify home PAP settings, continue               Osorio Steele, APRN-CNP

## 2024-10-10 NOTE — CONSULTS
Inpatient consult to cardiology  Consult performed by: Micha Cordova MD  Consult ordered by: SYED Corbin-CNP  Reason for consult: Atrial fibrillation management  Assessment/Recommendations: Patient was seen, chart reviewed.    Patient was admitted for shortness of breath diastolic heart failure and also atrial fibrillation.  Agree with cardiology service.  Patient will start sotalol 120 mg 1 tablet twice a day.    Continue telemetry  EKG daily  Continue anticoagulant therapy with Eliquis.  Possible cardioversion in a.m. tomorrow  Procedure, risk, benefits and possible complications were explained to patient.  All questions were answered.  Patient agrees with plan.  Informed consent was signed.    Risk factor modification and lifestyle modification discussed with patient. Diet , exercise and hydration discussed with patient.    Please excuse any errors in grammar or translation related to this dictation.  Voice recognition software was utilized to prepare this document.          History Of Present Illness:    Ger Roberts is a 48 y.o. male presenting with palpitations.    Past medical history diabetes mellitus severe obstructive sleep apnea status post T-tube, history of atrial fibrillation.  Patient presented to the hospital complaining of shortness of breath.  Apparently he has been using more diuretic therapy recently and also palpitations.    Patient states that he was diagnosed of atrial fibrillation at least 5 years ago.  He was placed on sotalol therapy    Due to financial reasons, he discontinued this medication.  He is basically recently seen at [facility] for management for atrial fibrillation.  He was cardioverted with evidence of recurrence of atrial fibrillation.  There was a discussion about putting him on antiarrhythmic therapy but patient lost follow-up with [facility].  Echocardiogram in June 2022 shows left intervention fraction 70% with trivial to respiratory rotation.  Event  monitor 2022 shows predominant rhythm sinus rhythm.  No evidence of atrial fibrillation.  No evidence of bradycardia or pauses.    EKG on arrival shows atrial fibrillation rate of 99 bpm QRS duration 86 ms QT corrected 444 ms.  Laboratory data on admission shows sodium 136 potassium 3.9 BUN 21 creatinine 0.94 GFR more than 90 LDL 64 magnesium 1.181 WBC 7.7 hemoglobin 14.8 hematocrit 40.6 platelet count 185.    Echocardiogram October 2024  CONCLUSIONS:   1. The left ventricular systolic function is normal, with a visually estimated ejection fraction of 70%.   2. Spectral Doppler shows an abnormal pattern of left ventricular diastolic filling.   3. There is moderate concentric left ventricular hypertrophy.   4. There is normal right ventricular global systolic function.   5. RVSP 33 mmHg.   6. The left atrium is mild to moderately dilated.   7. Slightly elevated right ventricular systolic pressure.   8. Normal valve structure and function.        Last Recorded Vitals:  Vitals:    10/10/24 0414 10/10/24 0916 10/10/24 1013 10/10/24 1113   BP: 126/72 142/87  123/89   BP Location:    Right arm   Patient Position:  Sitting  Sitting   Pulse: 86 93  91   Resp:  (!) 36  14   Temp: 36.6 °C (97.9 °F) 36.4 °C (97.5 °F)  36.4 °C (97.5 °F)   TempSrc:    Temporal   SpO2: 90% 94%  94%   Weight:   (!) 155 kg (342 lb 13 oz)    Height:           Last Labs:  CBC - 10/10/2024:  4:33 AM  7.7 14.8 185    48.6      CMP - 10/10/2024:  4:33 AM  9.0 7.0 16 --- 1.1   _ 4.1 13 41      PTT - No results in last year.  1.5   16.9 _     Troponin I, High Sensitivity   Date/Time Value Ref Range Status   10/09/2024 03:43 PM 12 0 - 20 ng/L Final   10/09/2024 01:26 PM 10 0 - 20 ng/L Final     BNP   Date/Time Value Ref Range Status   10/09/2024 01:26  (H) 0 - 99 pg/mL Final     LDL Calculated   Date/Time Value Ref Range Status   10/10/2024 04:33 AM 64 <=99 mg/dL Final     Comment:                                 Near   Borderline      AGE       Desirable  Optimal    High     High     Very High     0-19 Y     0 - 109     ---    110-129   >/= 130     ----    20-24 Y     0 - 119     ---    120-159   >/= 160     ----      >24 Y     0 -  99   100-129  130-159   160-189     >/=190       VLDL   Date/Time Value Ref Range Status   10/10/2024 04:33 AM 23 0 - 40 mg/dL Final      Last I/O:  I/O last 3 completed shifts:  In: 50 (0.3 mL/kg) [I.V.:50 (0.3 mL/kg)]  Out: 1825 (11.7 mL/kg) [Urine:1825 (0.3 mL/kg/hr)]  Weight: 156.5 kg     Past Cardiology Tests (Last 3 Years):  EKG:  ECG 12 lead 10/10/2024 (Preliminary)      ECG 12 Lead 10/10/2024 (Preliminary)      ECG 12 lead 10/09/2024      ECG 12 lead 10/09/2024    Echo:  Transthoracic Echo (TTE) Complete 10/10/2024    Ejection Fractions:  EF   Date/Time Value Ref Range Status   10/10/2024 09:20 AM 70 %      Cath:  No results found for this or any previous visit from the past 1095 days.    Stress Test:  No results found for this or any previous visit from the past 1095 days.    Cardiac Imaging:  No results found for this or any previous visit from the past 1095 days.      Past Medical History:  He has a past medical history of COPD (chronic obstructive pulmonary disease) (Multi).    Past Surgical History:  He has no past surgical history on file.      Social History:  He reports that he has an unknown smoking status. He has never been exposed to tobacco smoke. He has quit using smokeless tobacco.  His smokeless tobacco use included chew. Alcohol use questions deferred to the physician. Drug use questions deferred to the physician.    Family History:  No family history on file.     Allergies:  Patient has no known allergies.    Inpatient Medications:  Scheduled medications   Medication Dose Route Frequency    apixaban  5 mg oral BID    empagliflozin  10 mg oral Daily    furosemide  40 mg intravenous BID    glipiZIDE XL  20 mg oral Daily    insulin lispro  0-10 Units subcutaneous Before meals & nightly    losartan  50 mg  oral Daily    magnesium oxide  400 mg oral Daily    [START ON 10/11/2024] metoprolol tartrate  75 mg oral BID    polyethylene glycol  17 g oral Daily    sotalol  120 mg oral BID     PRN medications   Medication    acetaminophen    Or    acetaminophen    Or    acetaminophen    dextrose    dextrose    glucagon    glucagon     Continuous Medications   Medication Dose Last Rate    oxygen   8 L/min (10/09/24 1326)     Outpatient Medications:  Current Outpatient Medications   Medication Instructions    Eliquis 5 mg, oral, 2 times daily    glipiZIDE XL (GLUCOTROL XL) 20 mg, oral, Daily    Lasix 20 mg, oral, Daily    lisinopril 40 mg, oral, Daily    metoprolol tartrate (LOPRESSOR) 100 mg, oral, 2 times daily    Mounjaro 15 mg/0.5 mL pen injector subcutaneous       Physical Exam:  Vitals reviewed.   Constitutional:       General: He is not in acute distress.     Appearance: He is obese. He is not ill-appearing.   HENT:      Head: Normocephalic and atraumatic.      Mouth/Throat:      Comments: Tracheostomy present.  Eyes:      Extraocular Movements: Extraocular movements intact.      Conjunctiva/sclera: Conjunctivae normal.   Cardiovascular:      Rate and Rhythm: Normal rate and regular rhythm.      Pulses: Normal pulses.      Heart sounds: Normal heart sounds.   Pulmonary:      Effort: Pulmonary effort is normal.      Comments: No acute respiratory distress.  Breath sounds diminished auscultation bilaterally.  Abdominal:      General: Bowel sounds are normal. There is no distension.      Palpations: Abdomen is soft.      Tenderness: There is no abdominal tenderness. There is no guarding.   Musculoskeletal:         General: No tenderness. Normal range of motion.      Cervical back: Normal range of motion and neck supple.      Right lower leg: Edema present.      Left lower leg: Edema present.   Neurological:      General: No focal deficit present.      Mental Status: He is alert and oriented to person, place, and time. Mental  status is at baseline.   Psychiatric:         Mood and Affect: Mood normal.         Behavior: Behavior normal.      Assessment/Plan   Acute on chronic heart failure with preserved ejection fraction   Persistent atrial fibrillation  Hypomagnesia on admit, improved with replacement   Anticoagulation with Eliquis  Hypertension  Morbid obesity  Severe sleep apnea requiring tracheostomy  Type 2 diabetes mellitus  Chronic use of chewing tobacco    Peripheral IV 10/09/24 20 G Left Antecubital (Active)   Site Assessment Clean;Dry;Intact 10/10/24 0900   Dressing Status Clean;Dry 10/10/24 0900   Number of days: 1       Surgical Airway Shiley 6 (Active)   Site Assessment Clean;Dry 10/10/24 1452   Number of days:        Code Status:  Full Code    I spent 60 minutes in the professional and overall care of this patient.        Micha Cordova MD

## 2024-10-11 ENCOUNTER — ANESTHESIA (OUTPATIENT)
Dept: CARDIOLOGY | Facility: HOSPITAL | Age: 49
End: 2024-10-11
Payer: COMMERCIAL

## 2024-10-11 ENCOUNTER — HOSPITAL ENCOUNTER (INPATIENT)
Dept: CARDIOLOGY | Facility: HOSPITAL | Age: 49
Discharge: HOME | DRG: 291 | End: 2024-10-11
Payer: COMMERCIAL

## 2024-10-11 ENCOUNTER — APPOINTMENT (OUTPATIENT)
Dept: CARDIOLOGY | Facility: HOSPITAL | Age: 49
DRG: 291 | End: 2024-10-11
Payer: COMMERCIAL

## 2024-10-11 ENCOUNTER — ANESTHESIA EVENT (OUTPATIENT)
Dept: CARDIOLOGY | Facility: HOSPITAL | Age: 49
End: 2024-10-11
Payer: COMMERCIAL

## 2024-10-11 LAB
ANION GAP SERPL CALC-SCNC: 10 MMOL/L (ref 10–20)
ATRIAL RATE: 104 BPM
ATRIAL RATE: 93 BPM
ATRIAL RATE: 94 BPM
BASOPHILS # BLD AUTO: 0.03 X10*3/UL (ref 0–0.1)
BASOPHILS NFR BLD AUTO: 0.4 %
BUN SERPL-MCNC: 25 MG/DL (ref 6–23)
CALCIUM SERPL-MCNC: 8.9 MG/DL (ref 8.6–10.3)
CHLORIDE SERPL-SCNC: 96 MMOL/L (ref 98–107)
CO2 SERPL-SCNC: 36 MMOL/L (ref 21–32)
CREAT SERPL-MCNC: 1.03 MG/DL (ref 0.5–1.3)
EGFRCR SERPLBLD CKD-EPI 2021: 90 ML/MIN/1.73M*2
EOSINOPHIL # BLD AUTO: 0.37 X10*3/UL (ref 0–0.7)
EOSINOPHIL NFR BLD AUTO: 4.5 %
ERYTHROCYTE [DISTWIDTH] IN BLOOD BY AUTOMATED COUNT: 15.7 % (ref 11.5–14.5)
GLUCOSE BLD MANUAL STRIP-MCNC: 117 MG/DL (ref 74–99)
GLUCOSE BLD MANUAL STRIP-MCNC: 155 MG/DL (ref 74–99)
GLUCOSE BLD MANUAL STRIP-MCNC: 186 MG/DL (ref 74–99)
GLUCOSE BLD MANUAL STRIP-MCNC: 95 MG/DL (ref 74–99)
GLUCOSE SERPL-MCNC: 89 MG/DL (ref 74–99)
HCT VFR BLD AUTO: 48.3 % (ref 41–52)
HGB BLD-MCNC: 14.9 G/DL (ref 13.5–17.5)
HOLD SPECIMEN: NORMAL
IMM GRANULOCYTES # BLD AUTO: 0.03 X10*3/UL (ref 0–0.7)
IMM GRANULOCYTES NFR BLD AUTO: 0.4 % (ref 0–0.9)
LYMPHOCYTES # BLD AUTO: 1.42 X10*3/UL (ref 1.2–4.8)
LYMPHOCYTES NFR BLD AUTO: 17.4 %
MAGNESIUM SERPL-MCNC: 1.88 MG/DL (ref 1.6–2.4)
MCH RBC QN AUTO: 26.9 PG (ref 26–34)
MCHC RBC AUTO-ENTMCNC: 30.8 G/DL (ref 32–36)
MCV RBC AUTO: 87 FL (ref 80–100)
MONOCYTES # BLD AUTO: 0.8 X10*3/UL (ref 0.1–1)
MONOCYTES NFR BLD AUTO: 9.8 %
NEUTROPHILS # BLD AUTO: 5.52 X10*3/UL (ref 1.2–7.7)
NEUTROPHILS NFR BLD AUTO: 67.5 %
NRBC BLD-RTO: 0 /100 WBCS (ref 0–0)
PLATELET # BLD AUTO: 201 X10*3/UL (ref 150–450)
POTASSIUM SERPL-SCNC: 3.8 MMOL/L (ref 3.5–5.3)
Q ONSET: 213 MS
Q ONSET: 214 MS
Q ONSET: 216 MS
QRS COUNT: 14 BEATS
QRS COUNT: 15 BEATS
QRS COUNT: 16 BEATS
QRS DURATION: 84 MS
QRS DURATION: 90 MS
QRS DURATION: 94 MS
QT INTERVAL: 372 MS
QT INTERVAL: 382 MS
QT INTERVAL: 402 MS
QTC CALCULATION(BAZETT): 469 MS
QTC CALCULATION(BAZETT): 477 MS
QTC CALCULATION(BAZETT): 486 MS
QTC FREDERICIA: 435 MS
QTC FREDERICIA: 443 MS
QTC FREDERICIA: 456 MS
R AXIS: -32 DEGREES
R AXIS: -48 DEGREES
R AXIS: -73 DEGREES
RBC # BLD AUTO: 5.53 X10*6/UL (ref 4.5–5.9)
SODIUM SERPL-SCNC: 138 MMOL/L (ref 136–145)
T AXIS: 29 DEGREES
T AXIS: 51 DEGREES
T AXIS: 57 DEGREES
T OFFSET: 402 MS
T OFFSET: 404 MS
T OFFSET: 415 MS
VENTRICULAR RATE: 88 BPM
VENTRICULAR RATE: 94 BPM
VENTRICULAR RATE: 96 BPM
WBC # BLD AUTO: 8.2 X10*3/UL (ref 4.4–11.3)

## 2024-10-11 PROCEDURE — 93010 ELECTROCARDIOGRAM REPORT: CPT | Performed by: INTERNAL MEDICINE

## 2024-10-11 PROCEDURE — 99232 SBSQ HOSP IP/OBS MODERATE 35: CPT | Performed by: HOSPITALIST

## 2024-10-11 PROCEDURE — 2500000002 HC RX 250 W HCPCS SELF ADMINISTERED DRUGS (ALT 637 FOR MEDICARE OP, ALT 636 FOR OP/ED): Performed by: STUDENT IN AN ORGANIZED HEALTH CARE EDUCATION/TRAINING PROGRAM

## 2024-10-11 PROCEDURE — 82947 ASSAY GLUCOSE BLOOD QUANT: CPT

## 2024-10-11 PROCEDURE — 2500000001 HC RX 250 WO HCPCS SELF ADMINISTERED DRUGS (ALT 637 FOR MEDICARE OP): Performed by: NURSE PRACTITIONER

## 2024-10-11 PROCEDURE — 93005 ELECTROCARDIOGRAM TRACING: CPT

## 2024-10-11 PROCEDURE — 2500000004 HC RX 250 GENERAL PHARMACY W/ HCPCS (ALT 636 FOR OP/ED): Performed by: NURSE PRACTITIONER

## 2024-10-11 PROCEDURE — 99233 SBSQ HOSP IP/OBS HIGH 50: CPT | Performed by: INTERNAL MEDICINE

## 2024-10-11 PROCEDURE — 85025 COMPLETE CBC W/AUTO DIFF WBC: CPT | Performed by: STUDENT IN AN ORGANIZED HEALTH CARE EDUCATION/TRAINING PROGRAM

## 2024-10-11 PROCEDURE — 2500000002 HC RX 250 W HCPCS SELF ADMINISTERED DRUGS (ALT 637 FOR MEDICARE OP, ALT 636 FOR OP/ED): Performed by: HOSPITALIST

## 2024-10-11 PROCEDURE — 2500000004 HC RX 250 GENERAL PHARMACY W/ HCPCS (ALT 636 FOR OP/ED): Performed by: PHYSICIAN ASSISTANT

## 2024-10-11 PROCEDURE — 2500000002 HC RX 250 W HCPCS SELF ADMINISTERED DRUGS (ALT 637 FOR MEDICARE OP, ALT 636 FOR OP/ED): Performed by: NURSE PRACTITIONER

## 2024-10-11 PROCEDURE — 36415 COLL VENOUS BLD VENIPUNCTURE: CPT | Performed by: STUDENT IN AN ORGANIZED HEALTH CARE EDUCATION/TRAINING PROGRAM

## 2024-10-11 PROCEDURE — 82374 ASSAY BLOOD CARBON DIOXIDE: CPT | Performed by: STUDENT IN AN ORGANIZED HEALTH CARE EDUCATION/TRAINING PROGRAM

## 2024-10-11 PROCEDURE — 2060000001 HC INTERMEDIATE ICU ROOM DAILY

## 2024-10-11 PROCEDURE — 83735 ASSAY OF MAGNESIUM: CPT | Performed by: STUDENT IN AN ORGANIZED HEALTH CARE EDUCATION/TRAINING PROGRAM

## 2024-10-11 RX ORDER — POTASSIUM CHLORIDE 750 MG/1
10 TABLET, FILM COATED, EXTENDED RELEASE ORAL ONCE
Status: COMPLETED | OUTPATIENT
Start: 2024-10-11 | End: 2024-10-11

## 2024-10-11 RX ORDER — MORPHINE SULFATE 2 MG/ML
2 INJECTION, SOLUTION INTRAMUSCULAR; INTRAVENOUS ONCE
Status: DISCONTINUED | OUTPATIENT
Start: 2024-10-11 | End: 2024-10-11

## 2024-10-11 RX ORDER — POTASSIUM CHLORIDE 20 MEQ/1
40 TABLET, EXTENDED RELEASE ORAL ONCE
Status: COMPLETED | OUTPATIENT
Start: 2024-10-11 | End: 2024-10-11

## 2024-10-11 ASSESSMENT — COGNITIVE AND FUNCTIONAL STATUS - GENERAL
DAILY ACTIVITIY SCORE: 24
MOBILITY SCORE: 24
MOBILITY SCORE: 24
DAILY ACTIVITIY SCORE: 24

## 2024-10-11 ASSESSMENT — PAIN SCALES - GENERAL
PAINLEVEL_OUTOF10: 0 - NO PAIN
PAINLEVEL_OUTOF10: 0 - NO PAIN

## 2024-10-11 ASSESSMENT — PAIN - FUNCTIONAL ASSESSMENT: PAIN_FUNCTIONAL_ASSESSMENT: 0-10

## 2024-10-11 NOTE — CARE PLAN
Problem: Pain - Adult  Goal: Verbalizes/displays adequate comfort level or baseline comfort level  Outcome: Progressing     Problem: Safety - Adult  Goal: Free from fall injury  Outcome: Progressing   The patient's goals for the shift include      The clinical goals for the shift include o2 levels

## 2024-10-11 NOTE — NURSING NOTE
Provided patient with $10 copay card for Jardiance to use for fills should he be discharged over the weekend.

## 2024-10-11 NOTE — PROGRESS NOTES
Medical Group Progress Note  ASSESSMENT & PLAN:     Acute on chronic diastolic CHF exacerbation    Seen and examined   Clinically slightly better   Less leg edema  On trach collar  Saturation is 90%  Weight 155 kg  Input 1380  Output 3200  Negative balance 1820  Lasix 40 IV twice daily  BMP daily    Paroxysmal atrial fibrillation  Long-term anticoagulation use  Loading dose sotalol  Heart rate is controlled  Eliquis p.o. twice daily    Morbid obesity  Obesity hypoventilation syndrome  Obstructive sleep apnea  - Patient with tracheostomy, uses trach collar as needed    Type II DM  Insulin correction factor ACHS  Resume glipizide  Started on Jardiance    Hypertension  Losartan p.o. leeanne       VTE Prophylaxis: Home apixaban        Ramón Adams MD    SUBJECTIVE     Patient was seen and examined bedside this morning.  States that shortness of breath is improved slightly, has been urinating quite a bit with the diuresis.    OBJECTIVE:     Last Recorded Vitals:  Vitals:    10/10/24 1920 10/10/24 2300 10/11/24 0239 10/11/24 0800   BP: 124/80 117/79 102/56 141/79   BP Location:  Right arm  Right arm   Patient Position:  Sitting  Sitting   Pulse: 84 108 82    Resp: 18 22 18 19   Temp: 36.5 °C (97.7 °F) 36.7 °C (98.1 °F) 36.6 °C (97.9 °F) 36.5 °C (97.7 °F)   TempSrc:  Temporal  Temporal   SpO2: 95% 95% 93% 94%   Weight:       Height:         Last I/O:  I/O last 3 completed shifts:  In: 1430 (9.2 mL/kg) [P.O.:1380; I.V.:50 (0.3 mL/kg)]  Out: 5025 (32.3 mL/kg) [Urine:5025 (0.9 mL/kg/hr)]  Weight: 155.5 kg     Physical Exam  Vitals reviewed.   Constitutional:       General: He is not in acute distress.     Appearance: He is obese. He is not ill-appearing.   HENT:      Head: Normocephalic and atraumatic.      Mouth/Throat:      Comments: Tracheostomy present.  Eyes:      Extraocular Movements: Extraocular movements intact.      Conjunctiva/sclera: Conjunctivae normal.   Cardiovascular:      Rate and Rhythm: Normal rate and  regular rhythm.      Pulses: Normal pulses.      Heart sounds: Normal heart sounds.   Pulmonary:      Effort: Pulmonary effort is normal.      Comments: No acute respiratory distress.  Breath sounds diminished auscultation bilaterally.  Abdominal:      General: Bowel sounds are normal. There is no distension.      Palpations: Abdomen is soft.      Tenderness: There is no abdominal tenderness. There is no guarding.   Musculoskeletal:         General: No tenderness. Normal range of motion.      Cervical back: Normal range of motion and neck supple.      Right lower leg: Edema present.      Left lower leg: Edema present.   Neurological:      General: No focal deficit present.      Mental Status: He is alert and oriented to person, place, and time. Mental status is at baseline.   Psychiatric:         Mood and Affect: Mood normal.         Behavior: Behavior normal.       Inpatient Medications:  apixaban, 5 mg, oral, BID  empagliflozin, 10 mg, oral, Daily  furosemide, 40 mg, intravenous, BID  glipiZIDE XL, 20 mg, oral, Daily  insulin lispro, 0-10 Units, subcutaneous, Before meals & nightly  losartan, 50 mg, oral, Daily  magnesium oxide, 400 mg, oral, Daily  metoprolol tartrate, 75 mg, oral, BID  polyethylene glycol, 17 g, oral, Daily  potassium chloride CR, 10 mEq, oral, Once  sotalol, 120 mg, oral, BID    PRN Medications  PRN medications: acetaminophen **OR** acetaminophen **OR** acetaminophen, dextrose, dextrose, glucagon, glucagon  Continuous Medications:  oxygen, , Last Rate: 8 L/min (10/09/24 1326)      LABS AND IMAGING:     Labs:  Results from last 7 days   Lab Units 10/11/24  0425 10/10/24  0433 10/09/24  1326   WBC AUTO x10*3/uL 8.2 7.7 7.1   RBC AUTO x10*6/uL 5.53 5.52 5.74   HEMOGLOBIN g/dL 14.9 14.8 15.5   HEMATOCRIT % 48.3 48.6 50.8   MCV fL 87 88 89   MCH pg 26.9 26.8 27.0   MCHC g/dL 30.8* 30.5* 30.5*   RDW % 15.7* 15.6* 15.7*   PLATELETS AUTO x10*3/uL 201 185 168     Results from last 7 days   Lab Units  10/11/24  0425 10/10/24  0433 10/09/24  1326   SODIUM mmol/L 138 136 137   POTASSIUM mmol/L 3.8 3.9 4.1   CHLORIDE mmol/L 96* 97* 100   CO2 mmol/L 36* 33* 30   BUN mg/dL 25* 21 22   CREATININE mg/dL 1.03 0.94 0.98   GLUCOSE mg/dL 89 122* 124*   PROTEIN TOTAL g/dL  --   --  7.0   CALCIUM mg/dL 8.9 9.0 9.4   BILIRUBIN TOTAL mg/dL  --   --  1.1   ALK PHOS U/L  --   --  41   AST U/L  --   --  16   ALT U/L  --   --  13     Results from last 7 days   Lab Units 10/11/24  0425 10/10/24  0433 10/09/24  1326   MAGNESIUM mg/dL 1.88 1.81 1.49*     Results from last 7 days   Lab Units 10/09/24  1543 10/09/24  1326   TROPHS ng/L 12 10     Imaging:  ECG 12 lead  Atrial fibrillation with premature ventricular or aberrantly conducted complexes  Left axis deviation  Prolonged QT  Abnormal ECG  When compared with ECG of 11-OCT-2024 01:09, (unconfirmed)  No significant change was found  ECG 12 lead  Atrial fibrillation with premature ventricular or aberrantly conducted complexes  Left axis deviation  Abnormal ECG  When compared with ECG of 10-OCT-2024 15:01,  No significant change was found  ECG 12 lead  Atrial fibrillation with premature ventricular or aberrantly conducted complexes  Left axis deviation  Abnormal ECG  When compared with ECG of 10-OCT-2024 20:08, (unconfirmed)  No significant change was found

## 2024-10-11 NOTE — PROGRESS NOTES
Cardiology Progress Note  Patient: Ger Roberts  Unit/Bed: 815/815-A  YOB: 1975  MRN: [id number]  Acct: [id number]   Admitting Diagnosis:   Shortness of breath [R06.02]  Hypervolemia [E87.70]  Hypomagnesemia [E83.42]  Hypoxia [R09.02]  Persistent atrial fibrillation (Multi) [I48.19]  Date:  10/9/2024  Hospital Day: 2  Attending: Ramón Adams MD   Rounding EMELY/Cardiologist:  Katie Vieira APRN-CNP, Dr. Gunnar Oliveira    Primary Cardiologist: Dr. Micha Cordova      Complaint:  Chief Complaint   Patient presents with    Shortness of Breath        SUBJECTIVE    Sitting up in chair  Still with complaints of shortness of breath  Telemetry shows atrial fibrillation with heart rate 80s to 90s      VITALS   Visit Vitals  /79 (BP Location: Right arm, Patient Position: Sitting)   Pulse 82   Temp 36.5 °C (97.7 °F) (Temporal)   Resp 19        I/O:    Intake/Output Summary (Last 24 hours) at 10/11/2024 1114  Last data filed at 10/11/2024 1000  Gross per 24 hour   Intake 1080 ml   Output 3000 ml   Net -1920 ml        Allergies:  No Known Allergies     PHYSICAL EXAM   Physical Exam  Constitutional:       Appearance: He is obese.   HENT:      Head: Normocephalic.      Nose: Nose normal.      Mouth/Throat:      Mouth: Mucous membranes are moist.   Eyes:      Pupils: Pupils are equal, round, and reactive to light.   Cardiovascular:      Rate and Rhythm: Tachycardia present. Rhythm irregular.      Pulses: Normal pulses.      Heart sounds: Normal heart sounds.   Pulmonary:      Effort: Pulmonary effort is normal.      Breath sounds: Normal breath sounds.   Musculoskeletal:         General: Normal range of motion.   Skin:     General: Skin is warm and dry.   Neurological:      General: No focal deficit present.      Mental Status: He is alert and oriented to person, place, and time.   Psychiatric:         Mood and Affect: Mood normal.         Behavior: Behavior normal.           LABS     Results for orders placed  or performed during the hospital encounter of 10/09/24 (from the past 24 hour(s))   ECG 12 Lead   Result Value Ref Range    Ventricular Rate 94 BPM    Atrial Rate 105 BPM    QRS Duration 88 ms    QT Interval 366 ms    QTC Calculation(Bazett) 457 ms    R Axis -67 degrees    T Axis 36 degrees    QRS Count 15 beats    Q Onset 212 ms    T Offset 395 ms    QTC Fredericia 425 ms   ECG 12 lead   Result Value Ref Range    Ventricular Rate 98 BPM    Atrial Rate 107 BPM    QRS Duration 86 ms    QT Interval 372 ms    QTC Calculation(Bazett) 474 ms    R Axis -69 degrees    T Axis 35 degrees    QRS Count 15 beats    Q Onset 214 ms    T Offset 400 ms    QTC Fredericia 438 ms   POCT GLUCOSE   Result Value Ref Range    POCT Glucose 107 (H) 74 - 99 mg/dL   POCT GLUCOSE   Result Value Ref Range    POCT Glucose 153 (H) 74 - 99 mg/dL   ECG 12 lead   Result Value Ref Range    Ventricular Rate 96 BPM    Atrial Rate 104 BPM    QRS Duration 94 ms    QT Interval 372 ms    QTC Calculation(Bazett) 469 ms    R Axis -73 degrees    T Axis 29 degrees    QRS Count 16 beats    Q Onset 216 ms    T Offset 402 ms    QTC Fredericia 435 ms   POCT GLUCOSE   Result Value Ref Range    POCT Glucose 138 (H) 74 - 99 mg/dL   ECG 12 lead   Result Value Ref Range    Ventricular Rate 94 BPM    Atrial Rate 94 BPM    QRS Duration 84 ms    QT Interval 382 ms    QTC Calculation(Bazett) 477 ms    R Axis -48 degrees    T Axis 51 degrees    QRS Count 15 beats    Q Onset 213 ms    T Offset 404 ms    QTC Fredericia 443 ms   SST TOP   Result Value Ref Range    Extra Tube Hold for add-ons.    Basic Metabolic Panel   Result Value Ref Range    Glucose 89 74 - 99 mg/dL    Sodium 138 136 - 145 mmol/L    Potassium 3.8 3.5 - 5.3 mmol/L    Chloride 96 (L) 98 - 107 mmol/L    Bicarbonate 36 (H) 21 - 32 mmol/L    Anion Gap 10 10 - 20 mmol/L    Urea Nitrogen 25 (H) 6 - 23 mg/dL    Creatinine 1.03 0.50 - 1.30 mg/dL    eGFR 90 >60 mL/min/1.73m*2    Calcium 8.9 8.6 - 10.3 mg/dL   CBC and  Auto Differential   Result Value Ref Range    WBC 8.2 4.4 - 11.3 x10*3/uL    nRBC 0.0 0.0 - 0.0 /100 WBCs    RBC 5.53 4.50 - 5.90 x10*6/uL    Hemoglobin 14.9 13.5 - 17.5 g/dL    Hematocrit 48.3 41.0 - 52.0 %    MCV 87 80 - 100 fL    MCH 26.9 26.0 - 34.0 pg    MCHC 30.8 (L) 32.0 - 36.0 g/dL    RDW 15.7 (H) 11.5 - 14.5 %    Platelets 201 150 - 450 x10*3/uL    Neutrophils % 67.5 40.0 - 80.0 %    Immature Granulocytes %, Automated 0.4 0.0 - 0.9 %    Lymphocytes % 17.4 13.0 - 44.0 %    Monocytes % 9.8 2.0 - 10.0 %    Eosinophils % 4.5 0.0 - 6.0 %    Basophils % 0.4 0.0 - 2.0 %    Neutrophils Absolute 5.52 1.20 - 7.70 x10*3/uL    Immature Granulocytes Absolute, Automated 0.03 0.00 - 0.70 x10*3/uL    Lymphocytes Absolute 1.42 1.20 - 4.80 x10*3/uL    Monocytes Absolute 0.80 0.10 - 1.00 x10*3/uL    Eosinophils Absolute 0.37 0.00 - 0.70 x10*3/uL    Basophils Absolute 0.03 0.00 - 0.10 x10*3/uL   Magnesium   Result Value Ref Range    Magnesium 1.88 1.60 - 2.40 mg/dL   POCT GLUCOSE   Result Value Ref Range    POCT Glucose 95 74 - 99 mg/dL   ECG 12 lead   Result Value Ref Range    Ventricular Rate 88 BPM    Atrial Rate 93 BPM    QRS Duration 90 ms    QT Interval 402 ms    QTC Calculation(Bazett) 486 ms    R Axis -32 degrees    T Axis 57 degrees    QRS Count 14 beats    Q Onset 214 ms    T Offset 415 ms    QTC Fredericia 456 ms   POCT GLUCOSE   Result Value Ref Range    POCT Glucose 117 (H) 74 - 99 mg/dL        Scheduled medications  apixaban, 5 mg, oral, BID  empagliflozin, 10 mg, oral, Daily  furosemide, 40 mg, intravenous, BID  glipiZIDE XL, 20 mg, oral, Daily  insulin lispro, 0-10 Units, subcutaneous, Before meals & nightly  losartan, 50 mg, oral, Daily  magnesium oxide, 400 mg, oral, Daily  metoprolol tartrate, 75 mg, oral, BID  polyethylene glycol, 17 g, oral, Daily  sotalol, 120 mg, oral, BID      Continuous medications  oxygen, , Last Rate: 8 L/min (10/09/24 1326)      PRN medications  PRN medications: acetaminophen **OR**  acetaminophen **OR** acetaminophen, dextrose, dextrose, glucagon, glucagon     ECG 12 lead    Result Date: 10/11/2024  Atrial fibrillation with premature ventricular or aberrantly conducted complexes Left axis deviation Prolonged QT Abnormal ECG When compared with ECG of 11-OCT-2024 01:09, (unconfirmed) No significant change was found    ECG 12 lead    Result Date: 10/11/2024  Atrial fibrillation with premature ventricular or aberrantly conducted complexes Left axis deviation Abnormal ECG When compared with ECG of 10-OCT-2024 15:01, No significant change was found    ECG 12 lead    Result Date: 10/11/2024  Atrial fibrillation with premature ventricular or aberrantly conducted complexes Left axis deviation Abnormal ECG When compared with ECG of 10-OCT-2024 20:08, (unconfirmed) No significant change was found    ECG 12 lead    Result Date: 10/10/2024  Atrial fibrillation with premature ventricular or aberrantly conducted complexes Left axis deviation Abnormal ECG When compared with ECG of 10-OCT-2024 12:43, (unconfirmed) No significant change was found Confirmed by Micha Cordova ([id number]) on 10/10/2024 5:11:10 PM    ECG 12 Lead    Result Date: 10/10/2024  Atrial fibrillation Left axis deviation Abnormal ECG When compared with ECG of 09-OCT-2024 14:55, No significant change was found Confirmed by Micha Cordova ([id number]) on 10/10/2024 5:11:07 PM    Transthoracic Echo (TTE) Complete    Result Date: 10/10/2024          [place] [address]  Tel [phone] Fax [phone] TRANSTHORACIC ECHOCARDIOGRAM REPORT Patient Name:      MACEY Choi Physician:    [id number] Gunnar Oliveira MD, [facility] Study Date:        10/10/2024           Ordering Provider:    [id number] NIXON BREWSTER MRN/PID:           [id number]             Fellow: Accession#:        [id number]          Nurse:                Franky Blackwell RN Date of Birth/Age: 1975 / 48 years Sonographer:          April Farooq RDCS Gender:            M                    Additional Staff: Height:            182.88 cm            Admit Date:           10/9/2024 Weight:            156.49 kg            Admission Status:     Inpatient -                                                               Routine BSA / BMI:         2.69 m2 / 46.79      Department Location:  [place] [address]                                      Echo Lab Blood Pressure: 126 /72 mmHg Study Type:    TRANSTHORACIC ECHO (TTE) COMPLETE Diagnosis/ICD: Unspecified atrial fibrillation-I48.91 Indication:    AF CPT Codes:     Echo Complete w Full Doppler-[id number] Patient History: Diabetes:          Yes Pertinent History: A-Fib and Dyspnea. Study Detail: The following Echo studies were performed: 2D, M-Mode, Doppler and               color flow. Technically challenging study due to body habitus and               a tracheostomy present. Definity used as a contrast agent for               endocardial border definition. Total contrast used for this               procedure was 2.5 mL via IV push.  PHYSICIAN INTERPRETATION: Left Ventricle: The left ventricular systolic function is normal, with a visually estimated ejection fraction of 70%. There are no regional wall motion abnormalities. The left ventricular cavity size is normal. There is moderate concentric left ventricular hypertrophy. Spectral Doppler shows an abnormal pattern of left ventricular diastolic filling. Left Atrium: The left atrium is mild to moderately dilated. Right Ventricle: The right ventricle is normal in size. There is normal right ventricular global systolic function. RVSP 33 mmHg. Right Atrium: The right atrium is normal in size. Aortic Valve: The aortic valve appears structurally normal. The aortic valve  dimensionless index is 0.76. There is no evidence of aortic valve regurgitation. The peak instantaneous gradient of the aortic valve is 5.3 mmHg. The mean gradient of the aortic valve is 3.0 mmHg. Mitral Valve: The mitral valve is normal in structure. There is no evidence of mitral valve regurgitation. Tricuspid Valve: The tricuspid valve is structurally normal. There is mild tricuspid regurgitation. The Doppler estimated RVSP is slightly elevated right ventricular systolic pressure at 33.0 mmHg. Pulmonic Valve: The pulmonic valve is structurally normal. There is no indication of pulmonic valve regurgitation. Pericardium: Trivial pericardial effusion. Aorta: The aortic root is normal.  CONCLUSIONS:  1. The left ventricular systolic function is normal, with a visually estimated ejection fraction of 70%.  2. Spectral Doppler shows an abnormal pattern of left ventricular diastolic filling.  3. There is moderate concentric left ventricular hypertrophy.  4. There is normal right ventricular global systolic function.  5. RVSP 33 mmHg.  6. The left atrium is mild to moderately dilated.  7. Slightly elevated right ventricular systolic pressure.  8. Normal valve structure and function. QUANTITATIVE DATA SUMMARY:  2D MEASUREMENTS:           Normal Ranges: Ao Root d:       2.50 cm   (2.0-3.7cm) LAs:             4.50 cm   (2.7-4.0cm) IVSd:            1.25 cm   (0.6-1.1cm) LVPWd:           1.36 cm   (0.6-1.1cm) LVIDd:           4.97 cm   (3.9-5.9cm) LVIDs:           4.41 cm LV Mass Index:   97.1 g/m2 LV % FS          11.3 %  LA VOLUME:                    Normal Ranges: LA Vol A4C:        110.5 ml   (22+/-6mL/m2) LA Vol A2C:        125.1 ml LA Vol BP:         119.3 ml LA Vol Index A4C:  41.2ml/m2 LA Vol Index A2C:  46.6 ml/m2 LA Vol Index BP:   44.4 ml/m2 LA Area A4C:       30.6 cm2 LA Area A2C:       32.1 cm2 LA Major Axis A4C: 7.2 cm LA Major Axis A2C: 7.0 cm LA Volume Index:   41.6 ml/m2  RA VOLUME BY A/L METHOD:             Normal Ranges: RA Vol A4C:              88.5 ml    (8.3-19.5ml) RA Vol Index A4C:        32.9 ml/m2 RA Area A4C:             25.2 cm2 RA Major Axis A4C:       6.1 cm  AORTA MEASUREMENTS:         Normal Ranges: Asc Ao, d:          3.70 cm (2.1-3.4cm)  LV SYSTOLIC FUNCTION BY 2D PLANIMETRY (MOD):                      Normal Ranges: EF-A4C View:    32 % (>=55%) EF-A2C View:    28 % EF-Biplane:     29 % EF-Visual:      70 % LV EF Reported: 70 %  AORTIC VALVE:                     Normal Ranges: AoV Vmax:                1.15 m/s (<=1.7m/s) AoV Peak P.3 mmHg (<20mmHg) AoV Mean PG:             3.0 mmHg (1.7-11.5mmHg) LVOT Max Khoi:            0.93 m/s (<=1.1m/s) AoV VTI:                 22.10 cm (18-25cm) LVOT VTI:                16.80 cm LVOT Diameter:           2.00 cm  (1.8-2.4cm) AoV Area, VTI:           2.39 cm2 (2.5-5.5cm2) AoV Area,Vmax:           2.53 cm2 (2.5-4.5cm2) AoV Dimensionless Index: 0.76  RIGHT VENTRICLE: RV Basal 4.10 cm RV Mid   4.57 cm RV Major 7.9 cm TAPSE:   20.0 mm  TRICUSPID VALVE/RVSP:          Normal Ranges: Peak TR Velocity:     2.74 m/s RV Syst Pressure:     33 mmHg  (< 30mmHg)  PULMONIC VALVE:          Normal Ranges: PV Accel Time:  114 msec (>120ms) PV Max Khoi:     0.9 m/s  (0.6-0.9m/s) PV Max PG:      3.1 mmHg  [id number] [facility] Electronically signed on 10/10/2024 at 11:36:09 AM  ** Final **     CT angio chest for pulmonary embolism    Result Date: 10/9/2024  STUDY: CT Angiogram of the Chest; 10/09/2024 5:26 pm INDICATION: Tachycardia.  Hypoxia.  Exertional shortness of breath. COMPARISON: None Available. ACCESSION NUMBER(S): [id number] ORDERING CLINICIAN: ERICK MEJIA TECHNIQUE:  CTA of the chest was performed with intravenous contrast. Images are reviewed and processed at a workstation according to the CT angiogram protocol with 3-D and/or MIP post processing imaging generated.  Omnipaque 350, 75 mL was administered intravenously. Automated mA/kV exposure control  was utilized and patient examination was performed in strict accordance with principles of ALARA. FINDINGS: There is a endotracheal tube with tip projecting above the april. Pulmonary arteries are adequately opacified without acute or chronic filling defects.  The thoracic aorta is normal in course and caliber without dissection or aneurysm. The heart is normal in size without pericardial effusion.  Thoracic lymph nodes are not enlarged. There is no pleural effusion, pleural thickening, or pneumothorax. The airways are patent. There are diffuse bilateral groundglass opacities which may be infectious or inflammatory. No pulmonary consolidation.. Upper abdomen demonstrates no acute pathology. There are no acute fractures.  No suspicious bony lesions.    Endotracheal tube with tip projecting above the april. No evidence of pulmonary embolism or acute thoracic aortic pathology. Diffuse bilateral groundglass opacities which may be infectious or inflammatory. No pulmonary consolidation. Signed by Delmar Buck MD    ECG 12 lead    Result Date: 10/9/2024  Atrial fibrillation Left axis deviation Septal infarct (cited on or before 09-OCT-2024) Abnormal ECG When compared with ECG of 09-OCT-2024 12:10, (unconfirmed) No significant change was found See ED provider note for full interpretation and clinical correlation Confirmed by Deb Davila ([id number]) on 10/9/2024 5:27:02 PM    ECG 12 lead    Result Date: 10/9/2024  Atrial fibrillation Left axis deviation Septal infarct , age undetermined Abnormal ECG No previous ECGs available See ED provider note for full interpretation and clinical correlation Confirmed by Deb Davila ([id number]) on 10/9/2024 5:24:18 PM    XR chest 1 view    Result Date: 10/9/2024  Interpreted By:  Yonas Toribio, STUDY: XR CHEST 1 VIEW;  10/9/2024 12:53 pm   INDICATION: Signs/Symptoms:Shortness of breath, palpitations.     COMPARISON: None.   ACCESSION NUMBER(S): [id number]   ORDERING  CLINICIAN: ERICK MEJIA   FINDINGS: CARDIOMEDIASTINAL SILHOUETTE: Tracheostomy catheter tip projects proximally 8 cm above the april level. Cardiac silhouette is enlarged.   LUNGS: Irregular interstitial prominence is greatest toward the lung bases. Small pleural effusions not excluded. No appreciable pneumothorax.   ABDOMEN: No remarkable upper abdominal findings.   BONES: No acute osseous changes.       1.  Cardiomegaly. 2. Irregular interstitial prominence greatest toward the lung bases may represent vascular congestion/edema. Infection not excluded.       MACRO: None.   Signed by: Yonas Toribio 10/9/2024 12:58 PM Dictation workstation:   [id number]       Encounter Date: 10/09/24   ECG 12 lead   Result Value    Ventricular Rate 94    Atrial Rate 94    QRS Duration 84    QT Interval 382    QTC Calculation(Bazett) 477    R Axis -48    T Axis 51    QRS Count 15    Q Onset 213    T Offset 404    QTC Fredericia 443    Narrative    Atrial fibrillation with premature ventricular or aberrantly conducted complexes  Left axis deviation  Abnormal ECG  When compared with ECG of 10-OCT-2024 20:08, (unconfirmed)  No significant change was found        Tele Monitoring: Atrial fibrillation with heart rate 80s to 90s/EKG shows atrial fibrillation with heart rate 88 bpm and QTc 486 ms    Assessment     Patient Active Problem List   Diagnosis    Hypervolemia   Clinical impression:  Acute on chronic heart failure with preserved ejection fraction   Persistent atrial fibrillation  Hypomagnesia on admit, improved with replacement   Anticoagulation with Eliquis  Hypertension  Morbid obesity  Severe sleep apnea requiring tracheostomy  Type 2 diabetes mellitus  Chronic use of chewing tobacco    Plan:  Continue sotalol 120 mg twice daily  Continue metoprolol  Continue anticoagulation with Eliquis  Daily labs and EKG  Continue to monitor on telemetry  Supplemental potassium daily K over 4 and supplemental magnesium keep mag over  2  Cardioversion canceled for today   Outpatient follow-up with Dr. Cordova for consideration of medical management versus PVI          Electronically signed by WINSTON Horner on 10/11/2024 at 11:14 AM    Patient was seen, chart reviewed.    Patient is doing well    Atrial fibrillation  QT interval stable  Continue with sotalol  Continue beta-blocker therapy  Cardioversion early next week    Micha Cordova MD

## 2024-10-12 ENCOUNTER — APPOINTMENT (OUTPATIENT)
Dept: CARDIOLOGY | Facility: HOSPITAL | Age: 49
DRG: 291 | End: 2024-10-12
Payer: COMMERCIAL

## 2024-10-12 VITALS
OXYGEN SATURATION: 89 % | SYSTOLIC BLOOD PRESSURE: 103 MMHG | RESPIRATION RATE: 16 BRPM | DIASTOLIC BLOOD PRESSURE: 58 MMHG | TEMPERATURE: 97.2 F | HEART RATE: 83 BPM

## 2024-10-12 LAB
ANION GAP SERPL CALC-SCNC: 10 MMOL/L (ref 10–20)
ATRIAL RATE: 105 BPM
ATRIAL RATE: 394 BPM
ATRIAL RATE: 89 BPM
ATRIAL RATE: 92 BPM
BASOPHILS # BLD AUTO: 0.04 X10*3/UL (ref 0–0.1)
BASOPHILS NFR BLD AUTO: 0.6 %
BUN SERPL-MCNC: 31 MG/DL (ref 6–23)
CALCIUM SERPL-MCNC: 8.8 MG/DL (ref 8.6–10.3)
CHLORIDE SERPL-SCNC: 96 MMOL/L (ref 98–107)
CO2 SERPL-SCNC: 36 MMOL/L (ref 21–32)
CREAT SERPL-MCNC: 1.18 MG/DL (ref 0.5–1.3)
EGFRCR SERPLBLD CKD-EPI 2021: 76 ML/MIN/1.73M*2
EOSINOPHIL # BLD AUTO: 0.35 X10*3/UL (ref 0–0.7)
EOSINOPHIL NFR BLD AUTO: 5.3 %
ERYTHROCYTE [DISTWIDTH] IN BLOOD BY AUTOMATED COUNT: 15.8 % (ref 11.5–14.5)
GLUCOSE BLD MANUAL STRIP-MCNC: 105 MG/DL (ref 74–99)
GLUCOSE BLD MANUAL STRIP-MCNC: 114 MG/DL (ref 74–99)
GLUCOSE BLD MANUAL STRIP-MCNC: 130 MG/DL (ref 74–99)
GLUCOSE BLD MANUAL STRIP-MCNC: 169 MG/DL (ref 74–99)
GLUCOSE SERPL-MCNC: 87 MG/DL (ref 74–99)
HCT VFR BLD AUTO: 49.8 % (ref 41–52)
HGB BLD-MCNC: 15.1 G/DL (ref 13.5–17.5)
IMM GRANULOCYTES # BLD AUTO: 0.03 X10*3/UL (ref 0–0.7)
IMM GRANULOCYTES NFR BLD AUTO: 0.5 % (ref 0–0.9)
LYMPHOCYTES # BLD AUTO: 1.33 X10*3/UL (ref 1.2–4.8)
LYMPHOCYTES NFR BLD AUTO: 20 %
MAGNESIUM SERPL-MCNC: 2.24 MG/DL (ref 1.6–2.4)
MCH RBC QN AUTO: 26.9 PG (ref 26–34)
MCHC RBC AUTO-ENTMCNC: 30.3 G/DL (ref 32–36)
MCV RBC AUTO: 89 FL (ref 80–100)
MONOCYTES # BLD AUTO: 0.62 X10*3/UL (ref 0.1–1)
MONOCYTES NFR BLD AUTO: 9.3 %
NEUTROPHILS # BLD AUTO: 4.27 X10*3/UL (ref 1.2–7.7)
NEUTROPHILS NFR BLD AUTO: 64.3 %
NRBC BLD-RTO: 0 /100 WBCS (ref 0–0)
PLATELET # BLD AUTO: 195 X10*3/UL (ref 150–450)
POTASSIUM SERPL-SCNC: 4.1 MMOL/L (ref 3.5–5.3)
Q ONSET: 213 MS
Q ONSET: 214 MS
QRS COUNT: 13 BEATS
QRS COUNT: 14 BEATS
QRS COUNT: 16 BEATS
QRS COUNT: 16 BEATS
QRS DURATION: 82 MS
QRS DURATION: 86 MS
QRS DURATION: 90 MS
QRS DURATION: 90 MS
QT INTERVAL: 368 MS
QT INTERVAL: 378 MS
QT INTERVAL: 384 MS
QT INTERVAL: 406 MS
QTC CALCULATION(BAZETT): 427 MS
QTC CALCULATION(BAZETT): 477 MS
QTC CALCULATION(BAZETT): 483 MS
QTC CALCULATION(BAZETT): 485 MS
QTC FREDERICIA: 406 MS
QTC FREDERICIA: 444 MS
QTC FREDERICIA: 446 MS
QTC FREDERICIA: 456 MS
R AXIS: -73 DEGREES
R AXIS: -74 DEGREES
R AXIS: -76 DEGREES
R AXIS: 4 DEGREES
RBC # BLD AUTO: 5.62 X10*6/UL (ref 4.5–5.9)
SODIUM SERPL-SCNC: 138 MMOL/L (ref 136–145)
T AXIS: 23 DEGREES
T AXIS: 28 DEGREES
T AXIS: 35 DEGREES
T AXIS: 43 DEGREES
T OFFSET: 398 MS
T OFFSET: 403 MS
T OFFSET: 405 MS
T OFFSET: 417 MS
VENTRICULAR RATE: 81 BPM
VENTRICULAR RATE: 85 BPM
VENTRICULAR RATE: 93 BPM
VENTRICULAR RATE: 99 BPM
WBC # BLD AUTO: 6.6 X10*3/UL (ref 4.4–11.3)

## 2024-10-12 PROCEDURE — 82947 ASSAY GLUCOSE BLOOD QUANT: CPT

## 2024-10-12 PROCEDURE — 93005 ELECTROCARDIOGRAM TRACING: CPT

## 2024-10-12 PROCEDURE — 80048 BASIC METABOLIC PNL TOTAL CA: CPT | Performed by: STUDENT IN AN ORGANIZED HEALTH CARE EDUCATION/TRAINING PROGRAM

## 2024-10-12 PROCEDURE — 83735 ASSAY OF MAGNESIUM: CPT | Performed by: STUDENT IN AN ORGANIZED HEALTH CARE EDUCATION/TRAINING PROGRAM

## 2024-10-12 PROCEDURE — 99233 SBSQ HOSP IP/OBS HIGH 50: CPT | Performed by: INTERNAL MEDICINE

## 2024-10-12 PROCEDURE — 2500000002 HC RX 250 W HCPCS SELF ADMINISTERED DRUGS (ALT 637 FOR MEDICARE OP, ALT 636 FOR OP/ED): Performed by: STUDENT IN AN ORGANIZED HEALTH CARE EDUCATION/TRAINING PROGRAM

## 2024-10-12 PROCEDURE — 2500000005 HC RX 250 GENERAL PHARMACY W/O HCPCS: Performed by: PHYSICIAN ASSISTANT

## 2024-10-12 PROCEDURE — 2060000001 HC INTERMEDIATE ICU ROOM DAILY

## 2024-10-12 PROCEDURE — 2500000004 HC RX 250 GENERAL PHARMACY W/ HCPCS (ALT 636 FOR OP/ED): Performed by: INTERNAL MEDICINE

## 2024-10-12 PROCEDURE — 2500000001 HC RX 250 WO HCPCS SELF ADMINISTERED DRUGS (ALT 637 FOR MEDICARE OP): Performed by: NURSE PRACTITIONER

## 2024-10-12 PROCEDURE — 36415 COLL VENOUS BLD VENIPUNCTURE: CPT | Performed by: STUDENT IN AN ORGANIZED HEALTH CARE EDUCATION/TRAINING PROGRAM

## 2024-10-12 PROCEDURE — 85025 COMPLETE CBC W/AUTO DIFF WBC: CPT | Performed by: STUDENT IN AN ORGANIZED HEALTH CARE EDUCATION/TRAINING PROGRAM

## 2024-10-12 PROCEDURE — 93010 ELECTROCARDIOGRAM REPORT: CPT | Performed by: INTERNAL MEDICINE

## 2024-10-12 PROCEDURE — 99233 SBSQ HOSP IP/OBS HIGH 50: CPT | Performed by: HOSPITALIST

## 2024-10-12 PROCEDURE — 2500000001 HC RX 250 WO HCPCS SELF ADMINISTERED DRUGS (ALT 637 FOR MEDICARE OP): Performed by: HOSPITALIST

## 2024-10-12 RX ORDER — FUROSEMIDE 40 MG/1
20 TABLET ORAL
Status: DISCONTINUED | OUTPATIENT
Start: 2024-10-12 | End: 2024-10-12

## 2024-10-12 RX ORDER — FUROSEMIDE 40 MG/1
20 TABLET ORAL
Status: DISCONTINUED | OUTPATIENT
Start: 2024-10-12 | End: 2024-10-14 | Stop reason: HOSPADM

## 2024-10-12 RX ORDER — LANOLIN ALCOHOL/MO/W.PET/CERES
400 CREAM (GRAM) TOPICAL DAILY
Status: DISCONTINUED | OUTPATIENT
Start: 2024-10-12 | End: 2024-10-14 | Stop reason: HOSPADM

## 2024-10-12 ASSESSMENT — COGNITIVE AND FUNCTIONAL STATUS - GENERAL
DAILY ACTIVITIY SCORE: 24
MOBILITY SCORE: 24
DAILY ACTIVITIY SCORE: 24
MOBILITY SCORE: 24

## 2024-10-12 ASSESSMENT — PAIN - FUNCTIONAL ASSESSMENT
PAIN_FUNCTIONAL_ASSESSMENT: 0-10

## 2024-10-12 ASSESSMENT — PAIN SCALES - GENERAL
PAINLEVEL_OUTOF10: 0 - NO PAIN

## 2024-10-12 NOTE — PROGRESS NOTES
[facility] Progress Note               Rounding Cardiologist:  Micha Cordova MD, MD   Primary Cardiologist: Dr. Micha Cordova    Date:  10/12/2024  Patient:  Ger Roberts  YOB: 1975  MRN:  [id number]   Admit Date:  10/9/2024      SUBJECTIVE    Ger Roberts was seen and examined today at bedside.    Patient is doing well  Telemetry still showing atrial fibrillation.  Rates controlled  On sotalol therapy    EKG performed today shows atrial fibrillation heart rate of 93 bpm QRS duration 90 ms QT corrected 477 ms.    Sodium 138 potassium 4.1 chloride 96 BUN 31 creatinine 1.18 magnesium 2.24 WBC 6.6 hemoglobin 15.1.  Hematocrit 49.8 platelet count 195.    VITALS     Vitals:    10/12/24 0016 10/12/24 0318 10/12/24 0417 10/12/24 0752   BP:  116/63 117/71 115/69   BP Location:  Left arm  Right arm   Patient Position:  Lying  Sitting   Pulse:   96 81   Resp:  16  20   Temp:  36.5 °C (97.7 °F) 36.8 °C (98.2 °F) 36.2 °C (97.2 °F)   TempSrc:  Temporal  Temporal   SpO2: 94% 94% 96% 95%   Weight:       Height:           Intake/Output Summary (Last 24 hours) at 10/12/2024 1113  Last data filed at 10/12/2024 0752  Gross per 24 hour   Intake 700 ml   Output 1120 ml   Net -420 ml       [unfilled]    PHYSICAL EXAM   Vitals reviewed.   Constitutional:       General: He is not in acute distress.     Appearance: He is obese. He is not ill-appearing.   HENT:      Head: Normocephalic and atraumatic.      Mouth/Throat:      Comments: Tracheostomy present.  Eyes:      Extraocular Movements: Extraocular movements intact.      Conjunctiva/sclera: Conjunctivae normal.   Cardiovascular:      Rate and Rhythm: Normal rate and regular rhythm.      Pulses: Normal pulses.      Heart sounds: Normal heart sounds.   Pulmonary:      Effort: Pulmonary effort is normal.      Comments: No acute respiratory distress.  Breath sounds diminished auscultation bilaterally.  Abdominal:      General: Bowel sounds are normal. There is no  "distension.      Palpations: Abdomen is soft.      Tenderness: There is no abdominal tenderness. There is no guarding.   Musculoskeletal:         General: No tenderness. Normal range of motion.      Cervical back: Normal range of motion and neck supple.      Right lower leg: Edema present.      Left lower leg: Edema present.   Neurological:      General: No focal deficit present.      Mental Status: He is alert and oriented to person, place, and time. Mental status is at baseline.   Psychiatric:         Mood and Affect: Mood normal.         Behavior: Behavior normal.       DIAGNOSTIC RESULTS   EKG:     Telemetry: Atrial fibrillation, rates between 60-90 bpm  .      LAB DATA   BMP:  @LABRCNT(Na:3,K:3,CL:3,CO2:3,Bun:3,Creatinine:3,Glu:3, CA:3,LABGLOM)@    Cardiac Enzymes:  @LABRCNT(CKTOTAL:3,CKMB:3,CKMBINDEX:3,TROPONINI:3)@    CBC:   Lab Results   Component Value Date    WBC 6.6 10/12/2024    RBC 5.62 10/12/2024    HGB 15.1 10/12/2024    HCT 49.8 10/12/2024     10/12/2024       CMP:    Lab Results   Component Value Date     10/12/2024    K 4.1 10/12/2024    CL 96 (L) 10/12/2024    CO2 36 (H) 10/12/2024    BUN 31 (H) 10/12/2024    CREATININE 1.18 10/12/2024    GLUCOSE 87 10/12/2024    CALCIUM 8.8 10/12/2024       Hepatic Function Panel:    Lab Results   Component Value Date    ALKPHOS 41 10/09/2024    ALT 13 10/09/2024    AST 16 10/09/2024    PROT 7.0 10/09/2024    BILITOT 1.1 10/09/2024       Magnesium:    Lab Results   Component Value Date    MG 2.24 10/12/2024       PT/INR:    Lab Results   Component Value Date    PROTIME 16.9 (H) 10/09/2024    INR 1.5 (H) 10/09/2024     @LABRCNT(inr:3)@     HgBA1c:  No components found for: \"LABA1C\"    Lipid Profile:    Lab Results   Component Value Date    TRIG 113 10/10/2024    HDL 25.3 10/10/2024    LDLCALC 64 10/10/2024       TSH:  No results found for: \"TSH\"    ABG:  No results found for: \"PH\"    PRO-BNP: No results found for: \"PROBNP\"       RADIOLOGY "     Transthoracic Echo (TTE) Complete   Final Result      CT angio chest for pulmonary embolism   Final Result   Endotracheal tube with tip projecting above the april.   No evidence of pulmonary embolism or acute thoracic aortic pathology.   Diffuse bilateral groundglass opacities which may be infectious or   inflammatory. No pulmonary consolidation.   Signed by Delmar Buck MD      XR chest 1 view   Final Result   1.  Cardiomegaly.   2. Irregular interstitial prominence greatest toward the lung bases   may represent vascular congestion/edema. Infection not excluded.                  MACRO:   None.        Signed by: Yonas Toribio 10/9/2024 12:58 PM   Dictation workstation:   [id number]      Cardioversion External    (Results Pending)       [unfilled]      CURRENT MEDICATIONS    apixaban, 5 mg, oral, BID  empagliflozin, 10 mg, oral, Daily  furosemide, 20 mg, oral, BID  glipiZIDE XL, 20 mg, oral, Daily  insulin lispro, 0-10 Units, subcutaneous, Before meals & nightly  losartan, 50 mg, oral, Daily  magnesium oxide, 400 mg, oral, Daily  metoprolol tartrate, 75 mg, oral, BID  polyethylene glycol, 17 g, oral, Daily  sotalol, 120 mg, oral, BID      oxygen, , Last Rate: 8 L/min (10/09/24 1326)          ASSESSMENT   Principal Problem:    Hypervolemia        Patient Active Problem List   Diagnosis    Hypervolemia     Clinical impression:  Acute on chronic heart failure with preserved ejection fraction   Persistent atrial fibrillation  Hypomagnesia on admit, improved with replacement   Anticoagulation with Eliquis  Hypertension  Morbid obesity  Severe sleep apnea requiring tracheostomy  Type 2 diabetes mellitus  Chronic use of chewing tobacco    PLAN     Continue with current dose of sotalol therapy  For cardioversion early next week  Continue anticoagulant therapy with Eliquis  EKG daily  Telemetry    Micha Cordova MD      Please do not hesitate to call with questions.  Electronically signed by Micha Cordova MD,  FACC on 10/12/2024 at 11:13 AM

## 2024-10-12 NOTE — CARE PLAN
The clinical goals for the shift include Patient will remain hemodynamically stable this shift    Problem: Pain - Adult  Goal: Verbalizes/displays adequate comfort level or baseline comfort level  Outcome: Progressing     Problem: Safety - Adult  Goal: Free from fall injury  Outcome: Progressing     Problem: Discharge Planning  Goal: Discharge to home or other facility with appropriate resources  Outcome: Progressing     Problem: Chronic Conditions and Co-morbidities  Goal: Patient's chronic conditions and co-morbidity symptoms are monitored and maintained or improved  Outcome: Progressing     Problem: Heart Failure  Goal: Improved gas exchange this shift  Outcome: Progressing  Goal: Improved urinary output this shift  Outcome: Progressing  Goal: Reduction in peripheral edema within 24 hours  Outcome: Progressing  Goal: Report improvement of dyspnea/breathlessness this shift  Outcome: Progressing  Goal: Weight from fluid excess reduced over 2-3 days, then stabilize  Outcome: Progressing  Goal: Increase self care and/or family involvement in 24 hours  Outcome: Progressing     Problem: Fall/Injury  Goal: Not fall by end of shift  Outcome: Progressing  Goal: Be free from injury by end of the shift  Outcome: Progressing  Goal: Verbalize understanding of personal risk factors for fall in the hospital  Outcome: Progressing  Goal: Verbalize understanding of risk factor reduction measures to prevent injury from fall in the home  Outcome: Progressing  Goal: Use assistive devices by end of the shift  Outcome: Progressing  Goal: Pace activities to prevent fatigue by end of the shift  Outcome: Progressing

## 2024-10-12 NOTE — CARE PLAN
Problem: Pain - Adult  Goal: Verbalizes/displays adequate comfort level or baseline comfort level  Outcome: Progressing     Problem: Safety - Adult  Goal: Free from fall injury  Outcome: Progressing     Problem: Discharge Planning  Goal: Discharge to home or other facility with appropriate resources  Outcome: Progressing   The patient's goals for the shift include      The clinical goals for the shift include pt will remain hemodynamically stable

## 2024-10-12 NOTE — PROGRESS NOTES
Medical Group Progress Note  ASSESSMENT & PLAN:     Acute on chronic diastolic CHF exacerbation    Seen and examined   Clinically slightly better   No Complaints   Leg edema is better   On Trach collar   Saturation is 95%  Still Afib with controlled HR  Lasix 40 IV twice daily  Kidney function is stable   Sotalol loading   Jardiance daily   Plan is stays Afib Cardioversion on Monday  BMP daily    Paroxysmal atrial fibrillation  Long-term anticoagulation use  Still afib with controlled HR  Loading dose sotalol  Heart rate is controlled  Eliquis p.o. twice daily  Plan for cardioversion on Monday if not convert     Morbid obesity  Obesity hypoventilation syndrome  Obstructive sleep apnea  Patient with tracheostomy, uses trach collar as needed    Type II DM  Insulin correction factor ACHS  Resume glipizide  Started on Jardiance    Hypertension  Losartan p.o. leeanne       VTE Prophylaxis: Home apixaban        Ramón Adams MD    SUBJECTIVE     Seen and examined   Clinically better   No complaints   Leg edema is better     OBJECTIVE:     Last Recorded Vitals:  Vitals:    10/12/24 0016 10/12/24 0318 10/12/24 0417 10/12/24 0752   BP:  116/63 117/71 115/69   BP Location:  Left arm  Right arm   Patient Position:  Lying  Sitting   Pulse:   96 81   Resp:  16  20   Temp:  36.5 °C (97.7 °F) 36.8 °C (98.2 °F) 36.2 °C (97.2 °F)   TempSrc:  Temporal  Temporal   SpO2: 94% 94% 96% 95%   Weight:       Height:         Last I/O:  I/O last 3 completed shifts:  In: 600 (3.9 mL/kg) [P.O.:600]  Out: 2120 (13.6 mL/kg) [Urine:2120 (0.4 mL/kg/hr)]  Weight: 155.5 kg     Physical Exam  Vitals reviewed.   Constitutional:       General: He is not in acute distress.     Appearance: He is obese. He is not ill-appearing.   HENT:      Head: Normocephalic and atraumatic.      Mouth/Throat:      Comments: Tracheostomy present.  Eyes:      Extraocular Movements: Extraocular movements intact.      Conjunctiva/sclera: Conjunctivae normal.    Cardiovascular:      Rate and Rhythm: Normal rate and regular rhythm.      Pulses: Normal pulses.      Heart sounds: Normal heart sounds.   Pulmonary:      Effort: Pulmonary effort is normal.      Comments: No acute respiratory distress.  Breath sounds diminished auscultation bilaterally.  Abdominal:      General: Bowel sounds are normal. There is no distension.      Palpations: Abdomen is soft.      Tenderness: There is no abdominal tenderness. There is no guarding.   Musculoskeletal:         General: No tenderness. Normal range of motion.      Cervical back: Normal range of motion and neck supple.      Right lower leg: Edema present.      Left lower leg: Edema present.   Neurological:      General: No focal deficit present.      Mental Status: He is alert and oriented to person, place, and time. Mental status is at baseline.   Psychiatric:         Mood and Affect: Mood normal.         Behavior: Behavior normal.       Inpatient Medications:  apixaban, 5 mg, oral, BID  empagliflozin, 10 mg, oral, Daily  furosemide, 40 mg, intravenous, BID  glipiZIDE XL, 20 mg, oral, Daily  insulin lispro, 0-10 Units, subcutaneous, Before meals & nightly  losartan, 50 mg, oral, Daily  magnesium oxide, 400 mg, oral, Daily  metoprolol tartrate, 75 mg, oral, BID  polyethylene glycol, 17 g, oral, Daily  sotalol, 120 mg, oral, BID    PRN Medications  PRN medications: acetaminophen **OR** acetaminophen **OR** acetaminophen, dextrose, dextrose, glucagon, glucagon  Continuous Medications:  oxygen, , Last Rate: 8 L/min (10/09/24 1326)      LABS AND IMAGING:     Labs:  Results from last 7 days   Lab Units 10/12/24  0446 10/11/24  0425 10/10/24  0433   WBC AUTO x10*3/uL 6.6 8.2 7.7   RBC AUTO x10*6/uL 5.62 5.53 5.52   HEMOGLOBIN g/dL 15.1 14.9 14.8   HEMATOCRIT % 49.8 48.3 48.6   MCV fL 89 87 88   MCH pg 26.9 26.9 26.8   MCHC g/dL 30.3* 30.8* 30.5*   RDW % 15.8* 15.7* 15.6*   PLATELETS AUTO x10*3/uL 195 201 185     Results from last 7 days    Lab Units 10/12/24  0446 10/11/24  0425 10/10/24  0433 10/09/24  1326   SODIUM mmol/L 138 138 136 137   POTASSIUM mmol/L 4.1 3.8 3.9 4.1   CHLORIDE mmol/L 96* 96* 97* 100   CO2 mmol/L 36* 36* 33* 30   BUN mg/dL 31* 25* 21 22   CREATININE mg/dL 1.18 1.03 0.94 0.98   GLUCOSE mg/dL 87 89 122* 124*   PROTEIN TOTAL g/dL  --   --   --  7.0   CALCIUM mg/dL 8.8 8.9 9.0 9.4   BILIRUBIN TOTAL mg/dL  --   --   --  1.1   ALK PHOS U/L  --   --   --  41   AST U/L  --   --   --  16   ALT U/L  --   --   --  13     Results from last 7 days   Lab Units 10/12/24  0446 10/11/24  0425 10/10/24  0433   MAGNESIUM mg/dL 2.24 1.88 1.81     Results from last 7 days   Lab Units 10/09/24  1543 10/09/24  1326   TROPHS ng/L 12 10     Imaging:  ECG 12 lead  Atrial fibrillation with premature ventricular or aberrantly conducted complexes  Left axis deviation  Septal infarct , age undetermined  Abnormal ECG  When compared with ECG of 12-OCT-2024 01:31, (unconfirmed)  QRS axis Shifted left  Septal infarct is now Present  ECG 12 lead  Atrial fibrillation with premature ventricular or aberrantly conducted complexes  Prolonged QT  Abnormal ECG  When compared with ECG of 11-OCT-2024 15:05, (unconfirmed)  QRS axis Shifted right  Criteria for Inferior infarct are no longer Present

## 2024-10-12 NOTE — PROGRESS NOTES
Cardiology Progress Note  Patient: Ger Roberts  Unit/Bed: 815/815-A  YOB: 1975  MRN: [id number]  Acct: [id number]   Admitting Diagnosis: Shortness of breath [R06.02]  Hypervolemia [E87.70]  Hypomagnesemia [E83.42]  Hypoxia [R09.02]  Persistent atrial fibrillation (Multi) [I48.19]  Date:  10/9/2024  Hospital Day: 3  Attending: Ramón Adams MD   Rounding Cardiologist:  Gunnar Oliveira MD,   Primary Cardiologist:  Dr. Anthony       Complaint:  Chief Complaint   Patient presents with    Shortness of Breath        SUBJECTIVE    10/12/2024 less dyspnea less edema.  Eating and drinking well.    10/11/2024 still with dyspnea and lower extremity edema though both are improved    10/10/2024 initial consultation for continuation recurrent atrial fibrillation with diastolic CHF.  Sotalol instituted 10/10/2024    Sotalol scheduling  10/10/2024-2 doses, initial dose noon  10/11/2024-11 AM/11 PM QTc remains less than 500  10/12/2024, ECG this a.m. just prior to sotalol atrial fibrillation rate 85 QTc 483 ms prior ECGs also all QTc less than 500 remains on 120 sotalol        VITALS      Vitals:    10/12/24 0016 10/12/24 0318 10/12/24 0417 10/12/24 0752   BP:  116/63 117/71 115/69   BP Location:  Left arm  Right arm   Patient Position:  Lying  Sitting   Pulse:   96 81   Resp:  16  20   Temp:  36.5 °C (97.7 °F) 36.8 °C (98.2 °F) 36.2 °C (97.2 °F)   TempSrc:  Temporal  Temporal   SpO2: 94% 94% 96% 95%   Weight:       Height:           Intake/Output Summary (Last 24 hours) at 10/12/2024 0831  Last data filed at 10/12/2024 0752  Gross per 24 hour   Intake 700 ml   Output 2120 ml   Net -1420 ml      Wt Readings from Last 4 Encounters:   10/10/24 (!) 155 kg (342 lb 13 oz)        Allergies:  No Known Allergies     PHYSICAL EXAM   Physical Exam  Constitutional:       General: He is not in acute distress.     Appearance: Normal appearance. He is obese.   HENT:      Mouth/Throat:      Mouth: Mucous membranes are  moist.   Eyes:      Pupils: Pupils are equal, round, and reactive to light.   Cardiovascular:      Rate and Rhythm: Normal rate. Rhythm irregular.      Heart sounds: No murmur heard.     Comments: Large neck jugular venous tension hard to assess  Pulmonary:      Effort: Pulmonary effort is normal.      Breath sounds: Normal breath sounds.   Musculoskeletal:      Cervical back: Normal range of motion.      Right lower leg: Edema present.      Left lower leg: Edema present.   Skin:     General: Skin is warm.   Neurological:      General: No focal deficit present.      Mental Status: He is alert.   Psychiatric:         Mood and Affect: Mood normal.       Above exam as of 10/12/2024    LABS   CBC:   Results from last 7 days   Lab Units 10/12/24  0446 10/11/24  0425 10/10/24  0433   WBC AUTO x10*3/uL 6.6 8.2 7.7   RBC AUTO x10*6/uL 5.62 5.53 5.52   HEMOGLOBIN g/dL 15.1 14.9 14.8   HEMATOCRIT % 49.8 48.3 48.6   MCV fL 89 87 88   MCH pg 26.9 26.9 26.8   MCHC g/dL 30.3* 30.8* 30.5*   RDW % 15.8* 15.7* 15.6*   PLATELETS AUTO x10*3/uL 195 201 185     CMP:    Results from last 7 days   Lab Units 10/12/24  0446 10/11/24  0425 10/10/24  0433 10/09/24  1326   SODIUM mmol/L 138 138 136 137   POTASSIUM mmol/L 4.1 3.8 3.9 4.1   CHLORIDE mmol/L 96* 96* 97* 100   CO2 mmol/L 36* 36* 33* 30   BUN mg/dL 31* 25* 21 22   CREATININE mg/dL 1.18 1.03 0.94 0.98   GLUCOSE mg/dL 87 89 122* 124*   PROTEIN TOTAL g/dL  --   --   --  7.0   CALCIUM mg/dL 8.8 8.9 9.0 9.4   BILIRUBIN TOTAL mg/dL  --   --   --  1.1   ALK PHOS U/L  --   --   --  41   AST U/L  --   --   --  16   ALT U/L  --   --   --  13     BMP:    Results from last 7 days   Lab Units 10/12/24  0446 10/11/24  0425 10/10/24  0433   SODIUM mmol/L 138 138 136   POTASSIUM mmol/L 4.1 3.8 3.9   CHLORIDE mmol/L 96* 96* 97*   CO2 mmol/L 36* 36* 33*   BUN mg/dL 31* 25* 21   CREATININE mg/dL 1.18 1.03 0.94   CALCIUM mg/dL 8.8 8.9 9.0   GLUCOSE mg/dL 87 89 122*     Magnesium:  Results from last 7  days   Lab Units 10/12/24  0446 10/11/24  0425 10/10/24  0433   MAGNESIUM mg/dL 2.24 1.88 1.81     Troponin:    Results from last 7 days   Lab Units 10/09/24  1543 10/09/24  1326   TROPHS ng/L 12 10     BNP:   Results from last 7 days   Lab Units 10/09/24  1326   BNP pg/mL 312*     Lipid Panel:  Results from last 7 days   Lab Units 10/10/24  0433   HDL mg/dL 25.3   CHOLESTEROL/HDL RATIO  4.4   VLDL mg/dL 23   TRIGLYCERIDES mg/dL 113   NON HDL CHOL. mg/dL 87        apixaban, 5 mg, oral, BID  empagliflozin, 10 mg, oral, Daily  furosemide, 40 mg, intravenous, BID  glipiZIDE XL, 20 mg, oral, Daily  insulin lispro, 0-10 Units, subcutaneous, Before meals & nightly  losartan, 50 mg, oral, Daily  magnesium oxide, 400 mg, oral, Daily  metoprolol tartrate, 75 mg, oral, BID  polyethylene glycol, 17 g, oral, Daily  sotalol, 120 mg, oral, BID       oxygen, , Last Rate: 8 L/min (10/09/24 1326)       Current Outpatient Medications   Medication Instructions    Eliquis 5 mg, oral, 2 times daily    glipiZIDE XL (GLUCOTROL XL) 20 mg, oral, Daily    Lasix 20 mg, oral, Daily    lisinopril 40 mg, oral, Daily    metoprolol tartrate (LOPRESSOR) 100 mg, oral, 2 times daily    Mounjaro 15 mg/0.5 mL pen injector subcutaneous        Result Date: 10/10/2024          [place] [address]  Tel [phone] Fax [phone] TRANSTHORACIC ECHOCARDIOGRAM REPORT Patient Name:       CONCLUSIONS:  1. The left ventricular systolic function is normal, with a visually estimated ejection fraction of 70%.  2. Spectral Doppler shows an abnormal pattern of left ventricular diastolic filling.  3. There is moderate concentric left ventricular hypertrophy.  4. There is normal right ventricular global systolic function.  5. RVSP 33 mmHg.  6. The left atrium is mild to moderately dilated.  7. Slightly elevated right ventricular systolic pressure.  8. Normal valve structure and function. QUANTITATIVE DATA SUMMARY:  2D                     Encounter Date: 10/09/24   ECG 12 lead   Result Value    Ventricular Rate 85    Atrial Rate 92    QRS Duration 82    QT Interval 406    QTC Calculation(Bazett) 483    R Axis 4    T Axis 23    QRS Count 13    Q Onset 214    T Offset 417    QTC Fredericia 456    Narrative    Atrial fibrillation with premature ventricular or aberrantly conducted complexes  Prolonged QT  Abnormal ECG  When compared with ECG of 11-OCT-2024 15:05, (unconfirmed)  QRS axis Shifted right  Criteria for Inferior infarct are no longer Present        Tele Monitoring: Atrial fibrillation controlled rate much more frequent PVCs as of 10/12/2024 with occasional ventricular triplet    Assessment   Acute on chronic diastolic CHF with LVEF 70%: Continues to diurese quite well.  Edema markedly improved.  Lungs somewhat diminished breath sounds moves air well JVD difficult to assess.  Continue diuresis Jardiance and losartan.  Jardiance risk: Discussed with him in detail the importance of personal hygiene to avoid potential of groin infections or urinary tract infection.  Atrial fibrillation: Remains with rate control with average rate at 83 but increased PVCs.  Add magnesium.  Continue beta-blocker.  QTc remains well under 500 ms on current dose of sotalol continue same.  Switch to oral diuretic schedule  Hypertension well-controlled  Plan for cardioversion Monday with Dr. Cordova            Electronically signed by Gunnar Oliveira MD on 10/12/2024 at 8:31 AM

## 2024-10-13 ENCOUNTER — APPOINTMENT (OUTPATIENT)
Dept: CARDIOLOGY | Facility: HOSPITAL | Age: 49
DRG: 291 | End: 2024-10-13
Payer: COMMERCIAL

## 2024-10-13 VITALS
WEIGHT: 315 LBS | HEIGHT: 72 IN | BODY MASS INDEX: 42.66 KG/M2 | DIASTOLIC BLOOD PRESSURE: 82 MMHG | SYSTOLIC BLOOD PRESSURE: 142 MMHG | RESPIRATION RATE: 18 BRPM | TEMPERATURE: 97.5 F | HEART RATE: 83 BPM | OXYGEN SATURATION: 94 %

## 2024-10-13 LAB
ANION GAP SERPL CALC-SCNC: 9 MMOL/L (ref 10–20)
ATRIAL RATE: 75 BPM
ATRIAL RATE: 89 BPM
ATRIAL RATE: 92 BPM
ATRIAL RATE: 93 BPM
BASOPHILS # BLD AUTO: 0.05 X10*3/UL (ref 0–0.1)
BASOPHILS NFR BLD AUTO: 0.7 %
BUN SERPL-MCNC: 27 MG/DL (ref 6–23)
CALCIUM SERPL-MCNC: 8.6 MG/DL (ref 8.6–10.3)
CHLORIDE SERPL-SCNC: 100 MMOL/L (ref 98–107)
CO2 SERPL-SCNC: 35 MMOL/L (ref 21–32)
CREAT SERPL-MCNC: 1.03 MG/DL (ref 0.5–1.3)
EGFRCR SERPLBLD CKD-EPI 2021: 90 ML/MIN/1.73M*2
EOSINOPHIL # BLD AUTO: 0.38 X10*3/UL (ref 0–0.7)
EOSINOPHIL NFR BLD AUTO: 5.5 %
ERYTHROCYTE [DISTWIDTH] IN BLOOD BY AUTOMATED COUNT: 15.4 % (ref 11.5–14.5)
GLUCOSE BLD MANUAL STRIP-MCNC: 107 MG/DL (ref 74–99)
GLUCOSE BLD MANUAL STRIP-MCNC: 108 MG/DL (ref 74–99)
GLUCOSE BLD MANUAL STRIP-MCNC: 124 MG/DL (ref 74–99)
GLUCOSE BLD MANUAL STRIP-MCNC: 165 MG/DL (ref 74–99)
GLUCOSE SERPL-MCNC: 106 MG/DL (ref 74–99)
HCT VFR BLD AUTO: 48.8 % (ref 41–52)
HGB BLD-MCNC: 14.8 G/DL (ref 13.5–17.5)
HOLD SPECIMEN: NORMAL
IMM GRANULOCYTES # BLD AUTO: 0.02 X10*3/UL (ref 0–0.7)
IMM GRANULOCYTES NFR BLD AUTO: 0.3 % (ref 0–0.9)
LYMPHOCYTES # BLD AUTO: 1.4 X10*3/UL (ref 1.2–4.8)
LYMPHOCYTES NFR BLD AUTO: 20.3 %
MCH RBC QN AUTO: 26.9 PG (ref 26–34)
MCHC RBC AUTO-ENTMCNC: 30.3 G/DL (ref 32–36)
MCV RBC AUTO: 89 FL (ref 80–100)
MONOCYTES # BLD AUTO: 0.73 X10*3/UL (ref 0.1–1)
MONOCYTES NFR BLD AUTO: 10.6 %
NEUTROPHILS # BLD AUTO: 4.33 X10*3/UL (ref 1.2–7.7)
NEUTROPHILS NFR BLD AUTO: 62.6 %
NRBC BLD-RTO: 0 /100 WBCS (ref 0–0)
PLATELET # BLD AUTO: 193 X10*3/UL (ref 150–450)
POTASSIUM SERPL-SCNC: 4.2 MMOL/L (ref 3.5–5.3)
Q ONSET: 210 MS
Q ONSET: 213 MS
Q ONSET: 214 MS
Q ONSET: 216 MS
QRS COUNT: 13 BEATS
QRS COUNT: 14 BEATS
QRS COUNT: 14 BEATS
QRS COUNT: 15 BEATS
QRS DURATION: 86 MS
QRS DURATION: 88 MS
QRS DURATION: 92 MS
QRS DURATION: 92 MS
QT INTERVAL: 392 MS
QT INTERVAL: 394 MS
QT INTERVAL: 400 MS
QT INTERVAL: 400 MS
QTC CALCULATION(BAZETT): 460 MS
QTC CALCULATION(BAZETT): 468 MS
QTC CALCULATION(BAZETT): 476 MS
QTC CALCULATION(BAZETT): 489 MS
QTC FREDERICIA: 437 MS
QTC FREDERICIA: 442 MS
QTC FREDERICIA: 449 MS
QTC FREDERICIA: 458 MS
R AXIS: -55 DEGREES
R AXIS: -56 DEGREES
R AXIS: -66 DEGREES
R AXIS: -69 DEGREES
RBC # BLD AUTO: 5.51 X10*6/UL (ref 4.5–5.9)
SODIUM SERPL-SCNC: 140 MMOL/L (ref 136–145)
T AXIS: 10 DEGREES
T AXIS: 16 DEGREES
T AXIS: 33 DEGREES
T AXIS: 39 DEGREES
T OFFSET: 407 MS
T OFFSET: 412 MS
T OFFSET: 413 MS
T OFFSET: 414 MS
VENTRICULAR RATE: 83 BPM
VENTRICULAR RATE: 85 BPM
VENTRICULAR RATE: 85 BPM
VENTRICULAR RATE: 90 BPM
WBC # BLD AUTO: 6.9 X10*3/UL (ref 4.4–11.3)

## 2024-10-13 PROCEDURE — 93005 ELECTROCARDIOGRAM TRACING: CPT

## 2024-10-13 PROCEDURE — 85025 COMPLETE CBC W/AUTO DIFF WBC: CPT | Performed by: STUDENT IN AN ORGANIZED HEALTH CARE EDUCATION/TRAINING PROGRAM

## 2024-10-13 PROCEDURE — 93010 ELECTROCARDIOGRAM REPORT: CPT | Performed by: INTERNAL MEDICINE

## 2024-10-13 PROCEDURE — 2500000004 HC RX 250 GENERAL PHARMACY W/ HCPCS (ALT 636 FOR OP/ED): Performed by: INTERNAL MEDICINE

## 2024-10-13 PROCEDURE — 2500000001 HC RX 250 WO HCPCS SELF ADMINISTERED DRUGS (ALT 637 FOR MEDICARE OP): Performed by: STUDENT IN AN ORGANIZED HEALTH CARE EDUCATION/TRAINING PROGRAM

## 2024-10-13 PROCEDURE — 36415 COLL VENOUS BLD VENIPUNCTURE: CPT | Performed by: STUDENT IN AN ORGANIZED HEALTH CARE EDUCATION/TRAINING PROGRAM

## 2024-10-13 PROCEDURE — 82947 ASSAY GLUCOSE BLOOD QUANT: CPT

## 2024-10-13 PROCEDURE — 99233 SBSQ HOSP IP/OBS HIGH 50: CPT | Performed by: HOSPITALIST

## 2024-10-13 PROCEDURE — 2500000001 HC RX 250 WO HCPCS SELF ADMINISTERED DRUGS (ALT 637 FOR MEDICARE OP): Performed by: NURSE PRACTITIONER

## 2024-10-13 PROCEDURE — 2060000001 HC INTERMEDIATE ICU ROOM DAILY

## 2024-10-13 PROCEDURE — 2500000004 HC RX 250 GENERAL PHARMACY W/ HCPCS (ALT 636 FOR OP/ED): Performed by: NURSE PRACTITIONER

## 2024-10-13 PROCEDURE — 2500000002 HC RX 250 W HCPCS SELF ADMINISTERED DRUGS (ALT 637 FOR MEDICARE OP, ALT 636 FOR OP/ED): Performed by: STUDENT IN AN ORGANIZED HEALTH CARE EDUCATION/TRAINING PROGRAM

## 2024-10-13 PROCEDURE — 80048 BASIC METABOLIC PNL TOTAL CA: CPT | Performed by: STUDENT IN AN ORGANIZED HEALTH CARE EDUCATION/TRAINING PROGRAM

## 2024-10-13 PROCEDURE — 99232 SBSQ HOSP IP/OBS MODERATE 35: CPT | Performed by: INTERNAL MEDICINE

## 2024-10-13 PROCEDURE — 2500000001 HC RX 250 WO HCPCS SELF ADMINISTERED DRUGS (ALT 637 FOR MEDICARE OP): Performed by: HOSPITALIST

## 2024-10-13 PROCEDURE — 99233 SBSQ HOSP IP/OBS HIGH 50: CPT | Performed by: INTERNAL MEDICINE

## 2024-10-13 RX ORDER — NYSTATIN 100000 [USP'U]/G
1 POWDER TOPICAL 2 TIMES DAILY
Status: DISCONTINUED | OUTPATIENT
Start: 2024-10-13 | End: 2024-10-14 | Stop reason: HOSPADM

## 2024-10-13 ASSESSMENT — COGNITIVE AND FUNCTIONAL STATUS - GENERAL
DAILY ACTIVITIY SCORE: 24
MOBILITY SCORE: 24
MOBILITY SCORE: 24
DAILY ACTIVITIY SCORE: 24

## 2024-10-13 ASSESSMENT — PAIN SCALES - GENERAL
PAINLEVEL_OUTOF10: 0 - NO PAIN

## 2024-10-13 ASSESSMENT — PAIN - FUNCTIONAL ASSESSMENT
PAIN_FUNCTIONAL_ASSESSMENT: 0-10

## 2024-10-13 NOTE — PROGRESS NOTES
Cardiology Progress Note  Patient: Ger Roberts  Unit/Bed: 815/815-A  YOB: 1975  MRN: [id number]  Acct: [id number]   Admitting Diagnosis: Shortness of breath [R06.02]  Hypervolemia [E87.70]  Hypomagnesemia [E83.42]  Hypoxia [R09.02]  Persistent atrial fibrillation (Multi) [I48.19]  Date:  10/9/2024  Hospital Day: 4  Attending: Ramón Adams MD   Rounding Cardiologist:  Gunnar Oliveira MD,   Primary Cardiologist:  Dr. Anthony       Complaint:  Chief Complaint   Patient presents with    Shortness of Breath        SUBJECTIVE    10/13/2024 edema has resolved.  He is eating and drinking well.  He just woke up this morning and had very rhonchorous cough which he says is chronic related to his COPD he can mobilize the morning secretions on his own does not need suction.  This has been his pattern for years.  He is unaware of the atrial fibrillation; telemetry shows persistence thereof.    10/12/2024 less dyspnea less edema.  Eating and drinking well.    10/11/2024 still with dyspnea and lower extremity edema though both are improved    10/10/2024 initial consultation for continuation recurrent atrial fibrillation with diastolic CHF.  Sotalol instituted 10/10/2024    Sotalol scheduling  10/10/2024-2 doses, initial dose noon  10/11/2024-11 AM/11 PM QTc remains less than 500  10/12/2024, ECG this a.m. just prior to sotalol atrial fibrillation rate 85 QTc 483 ms prior ECGs also all QTc less than 500 remains on 120 sotalol  10 /13/24,   ECG this a.m. just prior to sotalol atrial fibrillation QTc 476 ms        VITALS      Vitals:    10/13/24 0347 10/13/24 0722 10/13/24 0746 10/13/24 0825   BP: 113/65  122/71    BP Location:       Patient Position:       Pulse: 79  88    Resp:   20    Temp: 36.6 °C (97.9 °F)  36.1 °C (97 °F)    TempSrc:   Temporal    SpO2: 94%  93% 93%   Weight:  (!) 150 kg (331 lb 5.6 oz)     Height:           Intake/Output Summary (Last 24 hours) at 10/13/2024 1013  Last data filed  at 10/13/2024 0500  Gross per 24 hour   Intake 840 ml   Output 1380 ml   Net -540 ml      Wt Readings from Last 4 Encounters:   10/13/24 (!) 150 kg (331 lb 5.6 oz)        Allergies:  No Known Allergies     PHYSICAL EXAM   Physical Exam  Constitutional:       General: He is not in acute distress.     Appearance: Normal appearance. He is obese.   HENT:      Mouth/Throat:      Mouth: Mucous membranes are moist.   Eyes:      Pupils: Pupils are equal, round, and reactive to light.   Cardiovascular:      Rate and Rhythm: Normal rate. Rhythm irregular.      Heart sounds: No murmur heard.     Comments: Large neck jugular venous tension hard to assess  Pulmonary:      Effort: Pulmonary effort is normal.      Breath sounds: Normal breath sounds.   Musculoskeletal:      Cervical back: Normal range of motion.      Right lower leg: Edema present.      Left lower leg: Edema present.   Skin:     General: Skin is warm.   Neurological:      General: No focal deficit present.      Mental Status: He is alert.   Psychiatric:         Mood and Affect: Mood normal.       Above exam as of 10/13/2024 with the addition that he does have very rhonchorous cough this morning which clears with cough    LABS   CBC:   Results from last 7 days   Lab Units 10/13/24  0425 10/12/24  0446 10/11/24  0425   WBC AUTO x10*3/uL 6.9 6.6 8.2   RBC AUTO x10*6/uL 5.51 5.62 5.53   HEMOGLOBIN g/dL 14.8 15.1 14.9   HEMATOCRIT % 48.8 49.8 48.3   MCV fL 89 89 87   MCH pg 26.9 26.9 26.9   MCHC g/dL 30.3* 30.3* 30.8*   RDW % 15.4* 15.8* 15.7*   PLATELETS AUTO x10*3/uL 193 195 201     CMP:    Results from last 7 days   Lab Units 10/13/24  0425 10/12/24  0446 10/11/24  0425 10/10/24  0433 10/09/24  1326   SODIUM mmol/L 140 138 138   < > 137   POTASSIUM mmol/L 4.2 4.1 3.8   < > 4.1   CHLORIDE mmol/L 100 96* 96*   < > 100   CO2 mmol/L 35* 36* 36*   < > 30   BUN mg/dL 27* 31* 25*   < > 22   CREATININE mg/dL 1.03 1.18 1.03   < > 0.98   GLUCOSE mg/dL 106* 87 89   < > 124*    PROTEIN TOTAL g/dL  --   --   --   --  7.0   CALCIUM mg/dL 8.6 8.8 8.9   < > 9.4   BILIRUBIN TOTAL mg/dL  --   --   --   --  1.1   ALK PHOS U/L  --   --   --   --  41   AST U/L  --   --   --   --  16   ALT U/L  --   --   --   --  13    < > = values in this interval not displayed.     BMP:    Results from last 7 days   Lab Units 10/13/24  0425 10/12/24  0446 10/11/24  0425   SODIUM mmol/L 140 138 138   POTASSIUM mmol/L 4.2 4.1 3.8   CHLORIDE mmol/L 100 96* 96*   CO2 mmol/L 35* 36* 36*   BUN mg/dL 27* 31* 25*   CREATININE mg/dL 1.03 1.18 1.03   CALCIUM mg/dL 8.6 8.8 8.9   GLUCOSE mg/dL 106* 87 89     Magnesium:  Results from last 7 days   Lab Units 10/12/24  0446 10/11/24  0425 10/10/24  0433   MAGNESIUM mg/dL 2.24 1.88 1.81     Troponin:    Results from last 7 days   Lab Units 10/09/24  1543 10/09/24  1326   TROPHS ng/L 12 10     BNP:   Results from last 7 days   Lab Units 10/09/24  1326   BNP pg/mL 312*     Lipid Panel:  Results from last 7 days   Lab Units 10/10/24  0433   HDL mg/dL 25.3   CHOLESTEROL/HDL RATIO  4.4   VLDL mg/dL 23   TRIGLYCERIDES mg/dL 113   NON HDL CHOL. mg/dL 87        apixaban, 5 mg, oral, BID  empagliflozin, 10 mg, oral, Daily  furosemide, 20 mg, oral, BID  glipiZIDE XL, 20 mg, oral, Daily  insulin lispro, 0-10 Units, subcutaneous, Before meals & nightly  losartan, 50 mg, oral, Daily  magnesium oxide, 400 mg, oral, Daily  metoprolol tartrate, 75 mg, oral, BID  polyethylene glycol, 17 g, oral, Daily  sotalol, 120 mg, oral, BID       oxygen, , Last Rate: 8 L/min (10/09/24 1326)       Current Outpatient Medications   Medication Instructions    Eliquis 5 mg, oral, 2 times daily    glipiZIDE XL (GLUCOTROL XL) 20 mg, oral, Daily    Lasix 20 mg, oral, Daily    lisinopril 40 mg, oral, Daily    metoprolol tartrate (LOPRESSOR) 100 mg, oral, 2 times daily    Mounjaro 15 mg/0.5 mL pen injector subcutaneous        Result Date: 10/10/2024          [address] [address]   Tel [phone] Fax [phone] TRANSTHORACIC ECHOCARDIOGRAM REPORT Patient Name:       CONCLUSIONS:  1. The left ventricular systolic function is normal, with a visually estimated ejection fraction of 70%.  2. Spectral Doppler shows an abnormal pattern of left ventricular diastolic filling.  3. There is moderate concentric left ventricular hypertrophy.  4. There is normal right ventricular global systolic function.  5. RVSP 33 mmHg.  6. The left atrium is mild to moderately dilated.  7. Slightly elevated right ventricular systolic pressure.  8. Normal valve structure and function. QUANTITATIVE DATA SUMMARY:  2D                    Encounter Date: 10/09/24   ECG 12 lead   Result Value    Ventricular Rate 85    Atrial Rate 89    QRS Duration 88    QT Interval 394    QTC Calculation(Bazett) 468    R Axis -56    T Axis 39    QRS Count 14    Q Onset 210    T Offset 407    QTC Fredericia 442    Narrative    Atrial fibrillation  Left axis deviation  Abnormal ECG  When compared with ECG of 12-OCT-2024 13:55, (unconfirmed)  No significant change was found     Personal review ECG this morning atrial fibrillation rate 85 QTc 476 no acute ST change    Tele Monitoring: Atrial fibrillation controlled rate as of 10/13/2024 PVCs have decreased in comparison to yesterday.    Assessment   Acute on chronic diastolic CHF with LVEF 70%: Continues to diurese quite well.  Edema markedly improved.  Lungs somewhat diminished breath sounds moves air well JVD difficult to assess.  Continue diuresis Jardiance and losartan.  Blood pressure very well-controlled clinically euvolemic on this combination of medication we will continue same  Jardiance risk: Discussed with him in detail the importance of personal hygiene to avoid potential of groin infections or urinary tract infection.  Atrial fibrillation: Remains with rate control with average rate at 83.  PVCs have decreased significantly with adding magnesium continue beta-blocker.  QTc  remains well under 500 ms on current dose of sotalol continue same.  Hypertension well-controlled without adverse effect of the current medication renal function remained stable  Plan for cardioversion tomorrow with Dr. Cordova.  His major risk is his COPD and morbid obesity and prior sleep apnea fortunately he has tracheostomy which does reduce risk of general anesthesia given ability of airway control still at increased risk of which patient is aware.  He will need follow-up in the [place] office with both Dr. Anthony for monitoring of his CHF medications and Dr. Cordova for his atrial fibrillation          Electronically signed by Gunnar Oliveira MD on 10/13/2024 at 10:13 AM

## 2024-10-13 NOTE — PROGRESS NOTES
[facility] Progress Note               Rounding Cardiologist:  Micha Cordova MD, MD   Primary Cardiologist: Dr. Micha Cordova    Date:  10/13/2024  Patient:  Ger Roberts  YOB: 1975  MRN:  [id number]   Admit Date:  10/9/2024      SUBJECTIVE    Ger Roberts was seen and examined today at bedside.   Doing well  Telemetry shows atrial fibrillation, rates controlled  EKG performed today shows atrial fibrillation rate of 85 bpm QRS duration 92 ms QT corrected 476 ms.  Sodium 140 potassium 4.2 bicarbonate 35 BUN 27 creatinine 1.03 WBC 6.9 hemoglobin 14.8 hematocrit 40.8 platelet count 193.    VITALS     Vitals:    10/13/24 0347 10/13/24 0722 10/13/24 0746 10/13/24 0825   BP: 113/65  122/71    BP Location:       Patient Position:       Pulse: 79  88    Resp:   20    Temp: 36.6 °C (97.9 °F)  36.1 °C (97 °F)    TempSrc:   Temporal    SpO2: 94%  93% 93%   Weight:  (!) 150 kg (331 lb 5.6 oz)     Height:           Intake/Output Summary (Last 24 hours) at 10/13/2024 1131  Last data filed at 10/13/2024 0500  Gross per 24 hour   Intake 840 ml   Output 1380 ml   Net -540 ml       [unfilled]    PHYSICAL EXAM   Physical Exam  Constitutional:       General: He is not in acute distress.     Appearance: Normal appearance. He is obese.   HENT:      Mouth/Throat:      Mouth: Mucous membranes are moist.   Eyes:      Pupils: Pupils are equal, round, and reactive to light.   Cardiovascular:      Rate and Rhythm: Normal rate. Rhythm irregular.      Heart sounds: No murmur heard.     Comments: Large neck jugular venous tension hard to assess  Pulmonary:      Effort: Pulmonary effort is normal.      Breath sounds: Normal breath sounds.   Musculoskeletal:      Cervical back: Normal range of motion.      Right lower leg: Edema present.      Left lower leg: Edema present.   Skin:     General: Skin is warm.   Neurological:      General: No focal deficit present.      Mental Status: He is alert.   Psychiatric:         Mood and  "Affect: Mood normal.     DIAGNOSTIC RESULTS   EKG:     Telemetry: Atrial fibrillation, rate controlled.      LAB DATA   BMP:  @LABRCNT(Na:3,K:3,CL:3,CO2:3,Bun:3,Creatinine:3,Glu:3, CA:3,LABGLOM)@    Cardiac Enzymes:  @LABRCNT(CKTOTAL:3,CKMB:3,CKMBINDEX:3,TROPONINI:3)@    CBC:   Lab Results   Component Value Date    WBC 6.9 10/13/2024    RBC 5.51 10/13/2024    HGB 14.8 10/13/2024    HCT 48.8 10/13/2024     10/13/2024       CMP:    Lab Results   Component Value Date     10/13/2024    K 4.2 10/13/2024     10/13/2024    CO2 35 (H) 10/13/2024    BUN 27 (H) 10/13/2024    CREATININE 1.03 10/13/2024    GLUCOSE 106 (H) 10/13/2024    CALCIUM 8.6 10/13/2024       Hepatic Function Panel:  No results found for: \"ALKPHOS\", \"ALT\", \"AST\", \"PROT\", \"BILITOT\", \"BILIDIR\"    Magnesium:    Lab Results   Component Value Date    MG 2.24 10/12/2024       PT/INR:  No results found for: \"PROTIME\", \"INR\"  @LABRCNT(inr:3)@     HgBA1c:  No components found for: \"LABA1C\"    Lipid Profile:  No results found for: \"CHLPL\", \"TRIG\", \"HDL\", \"LDLCALC\", \"LDLDIRECT\"    TSH:  No results found for: \"TSH\"    ABG:  No results found for: \"PH\"    PRO-BNP: No results found for: \"PROBNP\"       RADIOLOGY     Transthoracic Echo (TTE) Complete   Final Result      CT angio chest for pulmonary embolism   Final Result   Endotracheal tube with tip projecting above the april.   No evidence of pulmonary embolism or acute thoracic aortic pathology.   Diffuse bilateral groundglass opacities which may be infectious or   inflammatory. No pulmonary consolidation.   Signed by Delmar Buck MD      XR chest 1 view   Final Result   1.  Cardiomegaly.   2. Irregular interstitial prominence greatest toward the lung bases   may represent vascular congestion/edema. Infection not excluded.                  MACRO:   None.        Signed by: Yonas Toribio 10/9/2024 12:58 PM   Dictation workstation:   [id number]      Cardioversion External    (Results Pending) "       @Critical access hospital@      CURRENT MEDICATIONS    apixaban, 5 mg, oral, BID  empagliflozin, 10 mg, oral, Daily  furosemide, 20 mg, oral, BID  glipiZIDE XL, 20 mg, oral, Daily  insulin lispro, 0-10 Units, subcutaneous, Before meals & nightly  losartan, 50 mg, oral, Daily  magnesium oxide, 400 mg, oral, Daily  metoprolol tartrate, 75 mg, oral, BID  polyethylene glycol, 17 g, oral, Daily  sotalol, 120 mg, oral, BID      oxygen, , Last Rate: 8 L/min (10/09/24 1326)          ASSESSMENT   Principal Problem:    Hypervolemia        Patient Active Problem List   Diagnosis    Hypervolemia         Clinical impression:  Acute on chronic heart failure with preserved ejection fraction   Persistent atrial fibrillation  Hypomagnesia on admit, improved with replacement   Anticoagulation with Eliquis  Hypertension  Morbid obesity  Severe sleep apnea requiring tracheostomy  Type 2 diabetes mellitus  Chronic use of chewing tobacco    PLAN     Continue with sotalol therapy  Continue Eliquis therapy  For cardioversion in a.m. tomorrow.  Procedure, risk, benefits and possible complications were explained to patient.  All questions were answered.  Patient agrees with plan  Continue with telemetry  EKG daily    Micha Cordova MD      Please do not hesitate to call with questions.  Electronically signed by Micha Cordova MD, [facility] on 10/13/2024 at 11:31 AM

## 2024-10-13 NOTE — PROGRESS NOTES
Medical Group Progress Note  ASSESSMENT & PLAN:     Acute on chronic diastolic CHF exacerbation    Seen and examined   Clinically stable   No SOB or chest pain   Still afib   Hr is controlled   Leg edema is better   On Trach collar   Saturation is 93%  Lasix PO 20 mg BID   Kidney function is stable   Sotalol loading   Jardiance daily   NPO after midnight   Plan is stays Afib Cardioversion on Monday      Paroxysmal atrial fibrillation  Long-term anticoagulation use  Still afib with controlled HR  Loading dose sotalol  Lopressor PO 75 mg BID   Heart rate is controlled  Eliquis p.o. twice daily  Plan for cardioversion on Monday if not convert     Morbid obesity  Obesity hypoventilation syndrome  Obstructive sleep apnea  Patient with tracheostomy, uses trach collar as needed    Type II DM  Insulin correction factor ACHS  Resume glipizide  Started on Jardiance    Hypertension  Losartan p.o. leeanne       VTE Prophylaxis: Home apixaban        Ramón Adams MD    SUBJECTIVE     Seen and examined   Clinically better   No complaints   Leg edema is better     OBJECTIVE:     Last Recorded Vitals:  Vitals:    10/13/24 0722 10/13/24 0746 10/13/24 0825 10/13/24 1149   BP:  122/71  135/85   BP Location:       Patient Position:       Pulse:  88     Resp:  20     Temp:  36.1 °C (97 °F)     TempSrc:  Temporal     SpO2:  93% 93%    Weight: (!) 150 kg (331 lb 5.6 oz)      Height:         Last I/O:  I/O last 3 completed shifts:  In: 1540 (9.9 mL/kg) [P.O.:1540]  Out: 1700 (10.9 mL/kg) [Urine:1700 (0.3 mL/kg/hr)]  Weight: 155.5 kg     Physical Exam  Vitals reviewed.   Constitutional:       General: He is not in acute distress.     Appearance: He is obese. He is not ill-appearing.   HENT:      Head: Normocephalic and atraumatic.      Mouth/Throat:      Comments: Tracheostomy present.  Eyes:      Extraocular Movements: Extraocular movements intact.      Conjunctiva/sclera: Conjunctivae normal.   Cardiovascular:      Rate and Rhythm:  Normal rate and regular rhythm.      Pulses: Normal pulses.      Heart sounds: Normal heart sounds.   Pulmonary:      Effort: Pulmonary effort is normal.      Comments: No acute respiratory distress.  Breath sounds diminished auscultation bilaterally.  Abdominal:      General: Bowel sounds are normal. There is no distension.      Palpations: Abdomen is soft.      Tenderness: There is no abdominal tenderness. There is no guarding.   Musculoskeletal:         General: No tenderness. Normal range of motion.      Cervical back: Normal range of motion and neck supple.      Right lower leg: Edema present.      Left lower leg: Edema present.   Neurological:      General: No focal deficit present.      Mental Status: He is alert and oriented to person, place, and time. Mental status is at baseline.   Psychiatric:         Mood and Affect: Mood normal.         Behavior: Behavior normal.       Inpatient Medications:  apixaban, 5 mg, oral, BID  empagliflozin, 10 mg, oral, Daily  furosemide, 20 mg, oral, BID  glipiZIDE XL, 20 mg, oral, Daily  insulin lispro, 0-10 Units, subcutaneous, Before meals & nightly  losartan, 50 mg, oral, Daily  magnesium oxide, 400 mg, oral, Daily  metoprolol tartrate, 75 mg, oral, BID  polyethylene glycol, 17 g, oral, Daily  sotalol, 120 mg, oral, BID    PRN Medications  PRN medications: acetaminophen **OR** acetaminophen **OR** acetaminophen, dextrose, dextrose, glucagon, glucagon  Continuous Medications:  oxygen, , Last Rate: 8 L/min (10/09/24 1326)      LABS AND IMAGING:     Labs:  Results from last 7 days   Lab Units 10/13/24  0425 10/12/24  0446 10/11/24  0425   WBC AUTO x10*3/uL 6.9 6.6 8.2   RBC AUTO x10*6/uL 5.51 5.62 5.53   HEMOGLOBIN g/dL 14.8 15.1 14.9   HEMATOCRIT % 48.8 49.8 48.3   MCV fL 89 89 87   MCH pg 26.9 26.9 26.9   MCHC g/dL 30.3* 30.3* 30.8*   RDW % 15.4* 15.8* 15.7*   PLATELETS AUTO x10*3/uL 193 195 201     Results from last 7 days   Lab Units 10/13/24  0425 10/12/24  0446  10/11/24  0425 10/10/24  0433 10/09/24  1326   SODIUM mmol/L 140 138 138   < > 137   POTASSIUM mmol/L 4.2 4.1 3.8   < > 4.1   CHLORIDE mmol/L 100 96* 96*   < > 100   CO2 mmol/L 35* 36* 36*   < > 30   BUN mg/dL 27* 31* 25*   < > 22   CREATININE mg/dL 1.03 1.18 1.03   < > 0.98   GLUCOSE mg/dL 106* 87 89   < > 124*   PROTEIN TOTAL g/dL  --   --   --   --  7.0   CALCIUM mg/dL 8.6 8.8 8.9   < > 9.4   BILIRUBIN TOTAL mg/dL  --   --   --   --  1.1   ALK PHOS U/L  --   --   --   --  41   AST U/L  --   --   --   --  16   ALT U/L  --   --   --   --  13    < > = values in this interval not displayed.     Results from last 7 days   Lab Units 10/12/24  0446 10/11/24  0425 10/10/24  0433   MAGNESIUM mg/dL 2.24 1.88 1.81     Results from last 7 days   Lab Units 10/09/24  1543 10/09/24  1326   TROPHS ng/L 12 10     Imaging:  ECG 12 lead  Atrial fibrillation  Left axis deviation  Abnormal ECG  When compared with ECG of 13-OCT-2024 00:58, (unconfirmed)  No significant change was found  Confirmed by Micha Cordova ([id number]) on 10/13/2024 11:32:32 AM  Also confirmed by Zach Mulligan [id number])  on 10/13/2024 11:33:25 AM  ECG 12 lead  Atrial fibrillation  Left axis deviation  Abnormal ECG  When compared with ECG of 12-OCT-2024 13:55, (unconfirmed)  No significant change was found  Confirmed by Micha Cordova ([id number]) on 10/13/2024 11:32:21 AM  Also confirmed by Zach Mulligan [id number])  on 10/13/2024 11:33:21 AM  ECG 12 lead  Atrial fibrillation with premature ventricular or aberrantly conducted complexes  Left axis deviation  Prolonged QT  Abnormal ECG  When compared with ECG of 12-OCT-2024 07:17,  Criteria for Septal infarct are no longer Present  Confirmed by Micha Cordova ([id number]) on 10/13/2024 11:32:17 AM  Also confirmed by Zach Mulligan ([id number])  on 10/13/2024 11:32:54 AM

## 2024-10-13 NOTE — CARE PLAN
Problem: Safety - Adult  Goal: Free from fall injury  Outcome: Progressing     Problem: Heart Failure  Goal: Improved gas exchange this shift  Outcome: Progressing  Goal: Improved urinary output this shift  Outcome: Progressing  Goal: Reduction in peripheral edema within 24 hours  Outcome: Progressing  Goal: Report improvement of dyspnea/breathlessness this shift  Outcome: Progressing  Goal: Weight from fluid excess reduced over 2-3 days, then stabilize  Outcome: Progressing  Goal: Increase self care and/or family involvement in 24 hours  Outcome: Progressing

## 2024-10-14 ENCOUNTER — APPOINTMENT (OUTPATIENT)
Dept: CARDIOLOGY | Facility: HOSPITAL | Age: 49
DRG: 291 | End: 2024-10-14
Payer: COMMERCIAL

## 2024-10-14 ENCOUNTER — ANESTHESIA (OUTPATIENT)
Dept: CARDIOLOGY | Facility: HOSPITAL | Age: 49
DRG: 291 | End: 2024-10-14
Payer: COMMERCIAL

## 2024-10-14 ENCOUNTER — PHARMACY VISIT (OUTPATIENT)
Dept: PHARMACY | Facility: CLINIC | Age: 49
End: 2024-10-14
Payer: COMMERCIAL

## 2024-10-14 ENCOUNTER — ANESTHESIA EVENT (OUTPATIENT)
Dept: CARDIOLOGY | Facility: HOSPITAL | Age: 49
DRG: 291 | End: 2024-10-14
Payer: COMMERCIAL

## 2024-10-14 VITALS
BODY MASS INDEX: 42.66 KG/M2 | DIASTOLIC BLOOD PRESSURE: 84 MMHG | WEIGHT: 315 LBS | SYSTOLIC BLOOD PRESSURE: 132 MMHG | HEIGHT: 72 IN | RESPIRATION RATE: 18 BRPM | TEMPERATURE: 97.7 F | OXYGEN SATURATION: 96 % | HEART RATE: 78 BPM

## 2024-10-14 PROBLEM — J44.9 CHRONIC OBSTRUCTIVE PULMONARY DISEASE (MULTI): Status: ACTIVE | Noted: 2024-10-14

## 2024-10-14 PROBLEM — E11.9 DIABETES MELLITUS, TYPE 2 (MULTI): Status: ACTIVE | Noted: 2024-10-14

## 2024-10-14 PROBLEM — E66.813 CLASS 3 SEVERE OBESITY WITH BODY MASS INDEX (BMI) OF 40.0 TO 44.9 IN ADULT: Status: ACTIVE | Noted: 2024-10-14

## 2024-10-14 PROBLEM — I10 HTN (HYPERTENSION): Status: ACTIVE | Noted: 2024-10-14

## 2024-10-14 PROBLEM — I48.91 ATRIAL FIBRILLATION (MULTI): Status: ACTIVE | Noted: 2024-10-14

## 2024-10-14 PROBLEM — G47.33 OSA (OBSTRUCTIVE SLEEP APNEA): Status: ACTIVE | Noted: 2024-10-14

## 2024-10-14 PROBLEM — E66.01 CLASS 3 SEVERE OBESITY WITH BODY MASS INDEX (BMI) OF 40.0 TO 44.9 IN ADULT: Status: ACTIVE | Noted: 2024-10-14

## 2024-10-14 LAB
ANION GAP SERPL CALC-SCNC: 8 MMOL/L (ref 10–20)
ATRIAL RATE: 156 BPM
ATRIAL RATE: 62 BPM
ATRIAL RATE: 79 BPM
ATRIAL RATE: 93 BPM
BASOPHILS # BLD AUTO: 0.05 X10*3/UL (ref 0–0.1)
BASOPHILS NFR BLD AUTO: 0.8 %
BODY SURFACE AREA: 2.76 M2
BUN SERPL-MCNC: 21 MG/DL (ref 6–23)
CALCIUM SERPL-MCNC: 8.9 MG/DL (ref 8.6–10.3)
CHLORIDE SERPL-SCNC: 98 MMOL/L (ref 98–107)
CO2 SERPL-SCNC: 34 MMOL/L (ref 21–32)
CREAT SERPL-MCNC: 1.07 MG/DL (ref 0.5–1.3)
EGFRCR SERPLBLD CKD-EPI 2021: 86 ML/MIN/1.73M*2
EOSINOPHIL # BLD AUTO: 0.37 X10*3/UL (ref 0–0.7)
EOSINOPHIL NFR BLD AUTO: 5.6 %
ERYTHROCYTE [DISTWIDTH] IN BLOOD BY AUTOMATED COUNT: 15.1 % (ref 11.5–14.5)
GLUCOSE BLD MANUAL STRIP-MCNC: 103 MG/DL (ref 74–99)
GLUCOSE BLD MANUAL STRIP-MCNC: 104 MG/DL (ref 74–99)
GLUCOSE BLD MANUAL STRIP-MCNC: 107 MG/DL (ref 74–99)
GLUCOSE SERPL-MCNC: 96 MG/DL (ref 74–99)
HCT VFR BLD AUTO: 50.1 % (ref 41–52)
HGB BLD-MCNC: 14.9 G/DL (ref 13.5–17.5)
IMM GRANULOCYTES # BLD AUTO: 0.02 X10*3/UL (ref 0–0.7)
IMM GRANULOCYTES NFR BLD AUTO: 0.3 % (ref 0–0.9)
LYMPHOCYTES # BLD AUTO: 1.53 X10*3/UL (ref 1.2–4.8)
LYMPHOCYTES NFR BLD AUTO: 23 %
MCH RBC QN AUTO: 26.6 PG (ref 26–34)
MCHC RBC AUTO-ENTMCNC: 29.7 G/DL (ref 32–36)
MCV RBC AUTO: 90 FL (ref 80–100)
MONOCYTES # BLD AUTO: 0.64 X10*3/UL (ref 0.1–1)
MONOCYTES NFR BLD AUTO: 9.6 %
NEUTROPHILS # BLD AUTO: 4.03 X10*3/UL (ref 1.2–7.7)
NEUTROPHILS NFR BLD AUTO: 60.7 %
NRBC BLD-RTO: 0 /100 WBCS (ref 0–0)
PLATELET # BLD AUTO: 193 X10*3/UL (ref 150–450)
POTASSIUM SERPL-SCNC: 4.4 MMOL/L (ref 3.5–5.3)
Q ONSET: 210 MS
Q ONSET: 211 MS
Q ONSET: 212 MS
Q ONSET: 216 MS
QRS COUNT: 13 BEATS
QRS COUNT: 14 BEATS
QRS DURATION: 86 MS
QRS DURATION: 86 MS
QRS DURATION: 94 MS
QRS DURATION: 98 MS
QT INTERVAL: 374 MS
QT INTERVAL: 392 MS
QT INTERVAL: 404 MS
QT INTERVAL: 406 MS
QTC CALCULATION(BAZETT): 445 MS
QTC CALCULATION(BAZETT): 456 MS
QTC CALCULATION(BAZETT): 457 MS
QTC CALCULATION(BAZETT): 460 MS
QTC FREDERICIA: 420 MS
QTC FREDERICIA: 437 MS
QTC FREDERICIA: 439 MS
QTC FREDERICIA: 439 MS
R AXIS: -62 DEGREES
R AXIS: -63 DEGREES
R AXIS: -66 DEGREES
R AXIS: 205 DEGREES
RBC # BLD AUTO: 5.6 X10*6/UL (ref 4.5–5.9)
SODIUM SERPL-SCNC: 136 MMOL/L (ref 136–145)
T AXIS: 10 DEGREES
T AXIS: 3 DEGREES
T AXIS: 35 DEGREES
T AXIS: 37 DEGREES
T OFFSET: 398 MS
T OFFSET: 412 MS
T OFFSET: 412 MS
T OFFSET: 415 MS
VENTRICULAR RATE: 76 BPM
VENTRICULAR RATE: 77 BPM
VENTRICULAR RATE: 83 BPM
VENTRICULAR RATE: 85 BPM
WBC # BLD AUTO: 6.6 X10*3/UL (ref 4.4–11.3)

## 2024-10-14 PROCEDURE — 93010 ELECTROCARDIOGRAM REPORT: CPT | Performed by: INTERNAL MEDICINE

## 2024-10-14 PROCEDURE — 2500000001 HC RX 250 WO HCPCS SELF ADMINISTERED DRUGS (ALT 637 FOR MEDICARE OP): Performed by: NURSE PRACTITIONER

## 2024-10-14 PROCEDURE — 7100000009 HC PHASE TWO TIME - INITIAL BASE CHARGE

## 2024-10-14 PROCEDURE — 93005 ELECTROCARDIOGRAM TRACING: CPT

## 2024-10-14 PROCEDURE — 2500000001 HC RX 250 WO HCPCS SELF ADMINISTERED DRUGS (ALT 637 FOR MEDICARE OP): Performed by: HOSPITALIST

## 2024-10-14 PROCEDURE — 2500000004 HC RX 250 GENERAL PHARMACY W/ HCPCS (ALT 636 FOR OP/ED): Performed by: NURSE ANESTHETIST, CERTIFIED REGISTERED

## 2024-10-14 PROCEDURE — 82947 ASSAY GLUCOSE BLOOD QUANT: CPT

## 2024-10-14 PROCEDURE — 85025 COMPLETE CBC W/AUTO DIFF WBC: CPT | Performed by: STUDENT IN AN ORGANIZED HEALTH CARE EDUCATION/TRAINING PROGRAM

## 2024-10-14 PROCEDURE — 5A2204Z RESTORATION OF CARDIAC RHYTHM, SINGLE: ICD-10-PCS | Performed by: NURSE PRACTITIONER

## 2024-10-14 PROCEDURE — 36415 COLL VENOUS BLD VENIPUNCTURE: CPT | Performed by: STUDENT IN AN ORGANIZED HEALTH CARE EDUCATION/TRAINING PROGRAM

## 2024-10-14 PROCEDURE — 3700000002 HC GENERAL ANESTHESIA TIME - EACH INCREMENTAL 1 MINUTE

## 2024-10-14 PROCEDURE — 7100000010 HC PHASE TWO TIME - EACH INCREMENTAL 1 MINUTE

## 2024-10-14 PROCEDURE — 99232 SBSQ HOSP IP/OBS MODERATE 35: CPT | Performed by: NURSE PRACTITIONER

## 2024-10-14 PROCEDURE — RXMED WILLOW AMBULATORY MEDICATION CHARGE

## 2024-10-14 PROCEDURE — 7100000002 HC RECOVERY ROOM TIME - EACH INCREMENTAL 1 MINUTE

## 2024-10-14 PROCEDURE — 2500000005 HC RX 250 GENERAL PHARMACY W/O HCPCS: Performed by: PHYSICIAN ASSISTANT

## 2024-10-14 PROCEDURE — 92960 CARDIOVERSION ELECTRIC EXT: CPT

## 2024-10-14 PROCEDURE — 99239 HOSP IP/OBS DSCHRG MGMT >30: CPT | Performed by: HOSPITALIST

## 2024-10-14 PROCEDURE — 82374 ASSAY BLOOD CARBON DIOXIDE: CPT | Performed by: STUDENT IN AN ORGANIZED HEALTH CARE EDUCATION/TRAINING PROGRAM

## 2024-10-14 PROCEDURE — 2500000004 HC RX 250 GENERAL PHARMACY W/ HCPCS (ALT 636 FOR OP/ED): Performed by: INTERNAL MEDICINE

## 2024-10-14 PROCEDURE — 99024 POSTOP FOLLOW-UP VISIT: CPT | Performed by: NURSE PRACTITIONER

## 2024-10-14 PROCEDURE — 7100000001 HC RECOVERY ROOM TIME - INITIAL BASE CHARGE

## 2024-10-14 PROCEDURE — 3700000001 HC GENERAL ANESTHESIA TIME - INITIAL BASE CHARGE

## 2024-10-14 PROCEDURE — 92960 CARDIOVERSION ELECTRIC EXT: CPT | Performed by: INTERNAL MEDICINE

## 2024-10-14 PROCEDURE — 2500000004 HC RX 250 GENERAL PHARMACY W/ HCPCS (ALT 636 FOR OP/ED): Performed by: NURSE PRACTITIONER

## 2024-10-14 RX ORDER — LANOLIN ALCOHOL/MO/W.PET/CERES
400 CREAM (GRAM) TOPICAL DAILY
Qty: 30 TABLET | Refills: 1 | Status: SHIPPED | OUTPATIENT
Start: 2024-10-15 | End: 2024-12-14

## 2024-10-14 RX ORDER — FUROSEMIDE 20 MG/1
20 TABLET ORAL
Qty: 60 TABLET | Refills: 2 | Status: SHIPPED | OUTPATIENT
Start: 2024-10-14

## 2024-10-14 RX ORDER — SOTALOL HYDROCHLORIDE 120 MG/1
120 TABLET ORAL 2 TIMES DAILY
Qty: 60 TABLET | Refills: 5 | Status: SHIPPED | OUTPATIENT
Start: 2024-10-14 | End: 2025-04-12

## 2024-10-14 RX ORDER — LOSARTAN POTASSIUM 50 MG/1
50 TABLET ORAL DAILY
Qty: 30 TABLET | Refills: 2 | Status: SHIPPED | OUTPATIENT
Start: 2024-10-15

## 2024-10-14 RX ORDER — PROPOFOL 10 MG/ML
INJECTION, EMULSION INTRAVENOUS AS NEEDED
Status: DISCONTINUED | OUTPATIENT
Start: 2024-10-14 | End: 2024-10-14

## 2024-10-14 RX ORDER — METOPROLOL TARTRATE 50 MG/1
75 TABLET ORAL 2 TIMES DAILY
Qty: 90 TABLET | Refills: 5 | Status: SHIPPED | OUTPATIENT
Start: 2024-10-14 | End: 2025-04-12

## 2024-10-14 SDOH — HEALTH STABILITY: MENTAL HEALTH: CURRENT SMOKER: 0

## 2024-10-14 ASSESSMENT — PAIN - FUNCTIONAL ASSESSMENT
PAIN_FUNCTIONAL_ASSESSMENT: 0-10

## 2024-10-14 ASSESSMENT — PAIN SCALES - GENERAL
PAINLEVEL_OUTOF10: 0 - NO PAIN
PAIN_LEVEL: 1
PAINLEVEL_OUTOF10: 0 - NO PAIN

## 2024-10-14 NOTE — SIGNIFICANT EVENT
Upon arrival to room focused assessment performed and WDL. Pt denies dizziness/lightheadedness/pain. Sitting up in bed talking with RN. Paged EKG to bedside.

## 2024-10-14 NOTE — NURSING NOTE
Jardiance 14 day free supply delivered to patient's bedside nurse. Next fill $25. Delivered a $10 copay card on Friday to patient who placed it in his wallet.

## 2024-10-14 NOTE — PROGRESS NOTES
Status post cardioversion.  EKG shows sinus rhythm with heart rate 77 bpm QTc 470 ms.  Patient to continue on metoprolol tartrate 75 mg twice daily, sotalol 120 mg twice daily, and anticoagulation with Eliquis 5 mg twice daily.  Outpatient follow-up with Dr. Cordova as scheduled on November 6. Okay to discharge home per EP service.

## 2024-10-14 NOTE — PROGRESS NOTES
Plan is for Cardioversion today.    RN Navigator following for medication assistance.  Plan is home once medically able.

## 2024-10-14 NOTE — PROGRESS NOTES
Cardiology Progress Note  Patient: Ger Roberts  Unit/Bed: 815/815-A  YOB: 1975  MRN: [id number]  Acct: [id number]   Admitting Diagnosis: Shortness of breath [R06.02]  Hypervolemia [E87.70]  Hypomagnesemia [E83.42]  Hypoxia [R09.02]  Persistent atrial fibrillation (Multi) [I48.19]  Date:  10/9/2024  Hospital Day: 5  Attending: Ramón Adams MD   Rounding Cardiologist:  SYED Corbin-CNP,   Primary Cardiologist:  Dr. Anthony       Complaint:  Chief Complaint   Patient presents with    Shortness of Breath        SUBJECTIVE    10/14/2024  Patient sitting in reclining chair at the bedside awaiting cardioversion.  He denies chest pain, palpitations, shortness of breath.  He is walking to the bathroom in his room without difficulty.  EKG atrial fibrillation with ventricular rate 76 bpm, QT C4 156 ms.  Telemetry atrial fibrillation with average heart rates in the 80s    10/13/2024 edema has resolved.  He is eating and drinking well.  He just woke up this morning and had very rhonchorous cough which he says is chronic related to his COPD he can mobilize the morning secretions on his own does not need suction.  This has been his pattern for years.  He is unaware of the atrial fibrillation; telemetry shows persistence thereof.    10/12/2024 less dyspnea less edema.  Eating and drinking well.    10/11/2024 still with dyspnea and lower extremity edema though both are improved    10/10/2024 initial consultation for continuation recurrent atrial fibrillation with diastolic CHF.  Sotalol instituted 10/10/2024    Sotalol scheduling  10/10/2024-2 doses, initial dose noon  10/11/2024-11 AM/11 PM QTc remains less than 500  10/12/2024, ECG this a.m. just prior to sotalol atrial fibrillation rate 85 QTc 483 ms prior ECGs also all QTc less than 500 remains on 120 sotalol  10 /13/24,   ECG this a.m. just prior to sotalol atrial fibrillation QTc 476 ms  10/14/2024 remains on sotalol 120 mg twice  daily        VITALS      Vitals:    10/13/24 2000 10/13/24 2314 10/14/24 0354 10/14/24 0809   BP: 134/71 142/82 124/69 116/78   BP Location: Right arm   Right arm   Patient Position: Sitting   Lying   Pulse:  83 83    Resp: 18  17 19   Temp: 36.5 °C (97.7 °F) 36.4 °C (97.5 °F) 36.3 °C (97.3 °F) 36.7 °C (98.1 °F)   TempSrc: Temporal   Temporal   SpO2: 96% 94% 93% 92%   Weight:       Height:           Intake/Output Summary (Last 24 hours) at 10/14/2024 1039  Last data filed at 10/13/2024 2314  Gross per 24 hour   Intake 1060 ml   Output 2425 ml   Net -1365 ml      Wt Readings from Last 4 Encounters:   10/13/24 (!) 150 kg (331 lb 5.6 oz)        Allergies:  No Known Allergies     PHYSICAL EXAM   Physical Exam  Constitutional:       General: He is not in acute distress.     Appearance: He is obese.   HENT:      Mouth/Throat:      Mouth: Mucous membranes are moist.   Eyes:      Pupils: Pupils are equal, round, and reactive to light.   Neck:      Comments: Tracheostomy present  Cardiovascular:      Rate and Rhythm: Normal rate. Rhythm irregular.      Heart sounds: No murmur heard.     Comments: Large neck jugular venous tension hard to assess  Pulmonary:      Effort: Pulmonary effort is normal.      Breath sounds: Normal breath sounds.   Abdominal:      General: Bowel sounds are normal.      Palpations: Abdomen is soft.      Tenderness: There is no abdominal tenderness.   Musculoskeletal:      Cervical back: Normal range of motion.      Comments: Trace bilateral lower extremity edema   Skin:     General: Skin is warm and dry.   Neurological:      General: No focal deficit present.      Mental Status: He is alert and oriented to person, place, and time.   Psychiatric:         Mood and Affect: Mood normal.       Above exam as of 10/13/2024 with the addition that he does have very rhonchorous cough this morning which clears with cough    LABS   CBC:   Results from last 7 days   Lab Units 10/14/24  0525 10/13/24  9687  10/12/24  0446   WBC AUTO x10*3/uL 6.6 6.9 6.6   RBC AUTO x10*6/uL 5.60 5.51 5.62   HEMOGLOBIN g/dL 14.9 14.8 15.1   HEMATOCRIT % 50.1 48.8 49.8   MCV fL 90 89 89   MCH pg 26.6 26.9 26.9   MCHC g/dL 29.7* 30.3* 30.3*   RDW % 15.1* 15.4* 15.8*   PLATELETS AUTO x10*3/uL 193 193 195     CMP:    Results from last 7 days   Lab Units 10/14/24  0525 10/13/24  0425 10/12/24  0446 10/10/24  0433 10/09/24  1326   SODIUM mmol/L 136 140 138   < > 137   POTASSIUM mmol/L 4.4 4.2 4.1   < > 4.1   CHLORIDE mmol/L 98 100 96*   < > 100   CO2 mmol/L 34* 35* 36*   < > 30   BUN mg/dL 21 27* 31*   < > 22   CREATININE mg/dL 1.07 1.03 1.18   < > 0.98   GLUCOSE mg/dL 96 106* 87   < > 124*   PROTEIN TOTAL g/dL  --   --   --   --  7.0   CALCIUM mg/dL 8.9 8.6 8.8   < > 9.4   BILIRUBIN TOTAL mg/dL  --   --   --   --  1.1   ALK PHOS U/L  --   --   --   --  41   AST U/L  --   --   --   --  16   ALT U/L  --   --   --   --  13    < > = values in this interval not displayed.     BMP:    Results from last 7 days   Lab Units 10/14/24  0525 10/13/24  0425 10/12/24  0446   SODIUM mmol/L 136 140 138   POTASSIUM mmol/L 4.4 4.2 4.1   CHLORIDE mmol/L 98 100 96*   CO2 mmol/L 34* 35* 36*   BUN mg/dL 21 27* 31*   CREATININE mg/dL 1.07 1.03 1.18   CALCIUM mg/dL 8.9 8.6 8.8   GLUCOSE mg/dL 96 106* 87     Magnesium:  Results from last 7 days   Lab Units 10/12/24  0446 10/11/24  0425 10/10/24  0433   MAGNESIUM mg/dL 2.24 1.88 1.81     Troponin:    Results from last 7 days   Lab Units 10/09/24  1543 10/09/24  1326   TROPHS ng/L 12 10     BNP:   Results from last 7 days   Lab Units 10/09/24  1326   BNP pg/mL 312*     Lipid Panel:  Results from last 7 days   Lab Units 10/10/24  0433   HDL mg/dL 25.3   CHOLESTEROL/HDL RATIO  4.4   VLDL mg/dL 23   TRIGLYCERIDES mg/dL 113   NON HDL CHOL. mg/dL 87        apixaban, 5 mg, oral, BID  empagliflozin, 10 mg, oral, Daily  furosemide, 20 mg, oral, BID  glipiZIDE XL, 20 mg, oral, Daily  insulin lispro, 0-10 Units, subcutaneous,  Before meals & nightly  losartan, 50 mg, oral, Daily  magnesium oxide, 400 mg, oral, Daily  metoprolol tartrate, 75 mg, oral, BID  nystatin, 1 Application, Topical, BID  polyethylene glycol, 17 g, oral, Daily  sotalol, 120 mg, oral, BID       oxygen, , Last Rate: 8 L/min (10/09/24 1326)       Current Outpatient Medications   Medication Instructions    Eliquis 5 mg, oral, 2 times daily    glipiZIDE XL (GLUCOTROL XL) 20 mg, oral, Daily    Lasix 20 mg, oral, Daily    lisinopril 40 mg, oral, Daily    metoprolol tartrate (LOPRESSOR) 100 mg, oral, 2 times daily    Mounjaro 15 mg/0.5 mL pen injector subcutaneous        Result Date: 10/10/2024          [place] [address]  Tel [phone] Fax [phone] TRANSTHORACIC ECHOCARDIOGRAM REPORT Patient Name:       CONCLUSIONS:  1. The left ventricular systolic function is normal, with a visually estimated ejection fraction of 70%.  2. Spectral Doppler shows an abnormal pattern of left ventricular diastolic filling.  3. There is moderate concentric left ventricular hypertrophy.  4. There is normal right ventricular global systolic function.  5. RVSP 33 mmHg.  6. The left atrium is mild to moderately dilated.  7. Slightly elevated right ventricular systolic pressure.  8. Normal valve structure and function. QUANTITATIVE DATA SUMMARY:  2D                    Encounter Date: 10/09/24   ECG 12 lead   Result Value    Ventricular Rate 85    Atrial Rate 79    QRS Duration 86    QT Interval 374    QTC Calculation(Bazett) 445    R Axis -62    T Axis 37    QRS Count 14    Q Onset 211    T Offset 398    QTC Fredericia 420    Narrative    Atrial fibrillation  Left axis deviation  Abnormal ECG  When compared with ECG of 13-OCT-2024 13:49, (unconfirmed)  No significant change was found  Confirmed by Zach Mulligan ([id number]) on 10/14/2024 9:42:59 AM     Personal review ECG this morning atrial fibrillation rate 85 QTc 476 no acute ST change    Tele  Monitoring: Atrial fibrillation controlled rate as of 10/13/2024 PVCs have decreased in comparison to yesterday.    Assessment   Acute on chronic diastolic CHF with LVEF 70%: Appears compensated currently.  Continue furosemide 20 mg twice daily, Jardiance, metoprolol, and losartan at current doses.    Jardiance risk: Reviewed importance of personal hygiene to avoid potential of groin infections or urinary tract infection.  Atrial fibrillation: Sotalol 120 mg twice daily initiated this admission and patient is being followed by Dr. Cordova.  Heart rates are controlled.  EP plan is for cardioversion today.  Continue anticoagulation with Eliquis  Hypertension well-controlled without adverse effect of the current medication renal function remained stable  General Cardiology follow-up to be arranged, patient prefers [facility] as he resides in [facility].  Discussed with patient importance of following with both general cardiology from a heart failure standpoint as well as electrophysiology for rhythm management as outpatient after discharge.  RN navigator consulted his Jardiance prescription sent to [facility].  Patient may be discharged from general cardiology perspective.       Electronically signed by WINSTON Corbni on 10/14/2024 at 10:39 AM

## 2024-10-14 NOTE — CARE PLAN
Problem: Safety - Adult  Goal: Free from fall injury  Outcome: Progressing   The patient's goals for the shift include be able to go home tomorrow    The clinical goals for the shift include patient will remain safe throughout shift

## 2024-10-14 NOTE — ANESTHESIA PREPROCEDURE EVALUATION
Ger Roberts is a 48 y.o. male here for:    Cardioversion External  With * No providers found *  Persistent atrial fibrillation (Multi)    Relevant Problems   Cardiac   (+) Atrial fibrillation (Multi)   (+) HTN (hypertension)      Pulmonary   (+) Chronic obstructive pulmonary disease (Multi)   (+) HERLINDA (obstructive sleep apnea)      Endocrine   (+) Class 3 severe obesity with body mass index (BMI) of 40.0 to 44.9 in adult   (+) Diabetes mellitus, type 2 (Multi)       Lab Results   Component Value Date    HGB 14.9 10/14/2024    HCT 50.1 10/14/2024    WBC 6.6 10/14/2024     10/14/2024     10/14/2024    K 4.4 10/14/2024    CL 98 10/14/2024    CREATININE 1.07 10/14/2024    BUN 21 10/14/2024       Social History     Tobacco Use   Smoking Status Never    Passive exposure: Never   Smokeless Tobacco Former    Types: Chew       No Known Allergies    Current Outpatient Medications   Medication Instructions    Eliquis 5 mg, oral, 2 times daily    [START ON 10/15/2024] empagliflozin (JARDIANCE) 10 mg, oral, Daily    glipiZIDE XL (GLUCOTROL XL) 20 mg, oral, Daily    Lasix 20 mg, oral, Daily    lisinopril 40 mg, oral, Daily    metoprolol tartrate (LOPRESSOR) 100 mg, oral, 2 times daily    Mounjaro 15 mg/0.5 mL pen injector subcutaneous       No past surgical history on file.    No family history on file.    NPO Details:  No data recorded    Physical Exam    Airway  Mallampati: III     Cardiovascular    Dental    Pulmonary   Comments: Tracheostomy   Abdominal            Anesthesia Plan    History of general anesthesia?: yes  History of complications of general anesthesia?: no    ASA 4     MAC     The patient is not a current smoker.    intravenous induction   Anesthetic plan and risks discussed with patient.    Plan discussed with CRNA.

## 2024-10-14 NOTE — ANESTHESIA POSTPROCEDURE EVALUATION
Patient: Ger Roberts    Procedure Summary       Date: 10/14/24 Room / Location: [facility]    Anesthesia Start: 1355 Anesthesia Stop: 1413    Procedure: CARDIOVERSION EXTERNAL Diagnosis: Persistent atrial fibrillation (Multi)    Scheduled Providers: Ion Walker DO; Micha Cordova MD Responsible Provider: Ion Walker DO    Anesthesia Type: MAC ASA Status: 4            Anesthesia Type: MAC    Vitals Value Taken Time   /78 10/14/24 1413   Temp 36 10/14/24 1413   Pulse 80 10/14/24 1411   Resp 16 10/14/24 1413   SpO2 95 10/14/24 1413   Vitals shown include unfiled device data.    Anesthesia Post Evaluation    Patient location during evaluation: bedside  Patient participation: complete - patient participated  Level of consciousness: awake  Pain score: 1  Pain management: adequate  Airway patency: patent  Cardiovascular status: acceptable  Respiratory status: acceptable  Hydration status: acceptable  Postoperative Nausea and Vomiting: none        There were no known notable events for this encounter.

## 2024-10-14 NOTE — DISCHARGE SUMMARY
Discharge Diagnosis  Hypervolemia    Issues Requiring Follow-Up    Acute on chronic diastolic CHF exacerbation     Seen and examined   Clinically stable   No SOB or chest pain   Hr is controlled   Leg edema is better   On Trach collar   Saturation is 93%  Lasix PO 20 mg BID   Kidney function is stable   Sotalol loading   Jardiance daily   NPO after midnight   1 sinus rhythm  S/p cardioversion  Discharged home        Paroxysmal atrial fibrillation  Long-term anticoagulation use  Loading dose sotalol  Lopressor PO 75 mg BID   Heart rate is controlled  Eliquis p.o. twice daily  S/p cardioversion  Now in sinus rhythm       Morbid obesity  Obesity hypoventilation syndrome  Obstructive sleep apnea  Patient with tracheostomy, uses trach collar as needed     Type II DM  Insulin correction factor ACHS  Resume glipizide  Started on Jardiance     Hypertension  Losartan p.o. leeanne     VTE Prophylaxis: Home apixaban        Discharge Meds     Medication List      START taking these medications     Jardiance 10 mg; Generic drug: empagliflozin; Take 1 tablet (10 mg) by   mouth once daily.; Start taking on: October 15, 2024   losartan 50 mg tablet; Commonly known as: Cozaar; Take 1 tablet (50 mg)   by mouth once daily. Replaces lisinopril; Start taking on: October 15,   2024   magnesium oxide 400 mg (241.3 mg magnesium) tablet; Commonly known as:   Mag-Ox; Take 1 tablet (400 mg) by mouth once daily.; Start taking on:   October 15, 2024   sotalol 120 mg tablet; Commonly known as: Betapace; Take 1 tablet (120   mg) by mouth 2 times a day.     CONTINUE taking these medications     Eliquis 5 mg tablet; Generic drug: apixaban   furosemide 20 mg tablet; Commonly known as: Lasix; Take 1 tablet (20 mg)   by mouth 2 times daily (morning and late afternoon).   glipiZIDE XL 10 mg 24 hr tablet; Commonly known as: Glucotrol XL   metoprolol tartrate 50 mg tablet; Commonly known as: Lopressor; Take 1.5   tablets (75 mg) by mouth 2 times a day.    Mounjaro 15 mg/0.5 mL pen injector; Generic drug: tirzepatide     STOP taking these medications     lisinopril 40 mg tablet       Test Results Pending At Discharge  Pending Labs       No current pending labs.            Hospital Course       Pertinent Physical Exam At Time of Discharge  Physical Exam    Constitutional:       General: He is not in acute distress.     Appearance: He is obese. He is not ill-appearing.   HENT:      Head: Normocephalic and atraumatic.      Mouth/Throat:      Comments: Tracheostomy present.  Eyes:      Extraocular Movements: Extraocular movements intact.      Conjunctiva/sclera: Conjunctivae normal.   Cardiovascular:      Rate and Rhythm: Normal rate and regular rhythm.      Pulses: Normal pulses.      Heart sounds: Normal heart sounds.   Pulmonary:      Effort: Pulmonary effort is normal.      Comments: No acute respiratory distress.  Breath sounds diminished auscultation bilaterally.  Abdominal:      General: Bowel sounds are normal. There is no distension.      Palpations: Abdomen is soft.      Tenderness: There is no abdominal tenderness. There is no guarding.   Musculoskeletal:         General: No tenderness. Normal range of motion.      Cervical back: Normal range of motion and neck supple.      Right lower leg: Edema present.      Left lower leg: Edema present.   Neurological:      General: No focal deficit present.      Mental Status: He is alert and oriented to person, place, and time. Mental status is at baseline.   Psychiatric:         Mood and Affect: Mood normal.         Behavior: Behavior normal.  Outpatient Follow-Up  Future Appointments   Date Time Provider Department Center   10/24/2024  1:00 PM James Higgins MD [id number] [place]   11/6/2024  9:15 AM Micha Cordova MD [id number] [place]       Discharge time more than 30-minute      Ramón Adams MD

## 2024-10-14 NOTE — PROGRESS NOTES
Medical Group Progress Note  ASSESSMENT & PLAN:     Acute on chronic diastolic CHF exacerbation    Seen and examined   Clinically stable   No complaints   Still afib with Hr controlled   On Trach collar   Lasix PO 20 mg BID   Kidney function is stable   Sotalol PO BID  Jardiance daily   Plan for RADHA cardioversion today       Paroxysmal atrial fibrillation  Long-term anticoagulation use  Still afib with controlled HR  Loading dose sotalol  Lopressor PO 75 mg BID   Heart rate is controlled  Eliquis p.o. twice daily  Plan for Cardioversion today     Morbid obesity  Obesity hypoventilation syndrome  Obstructive sleep apnea  Patient with tracheostomy, uses trach collar as needed    Type II DM  Insulin correction factor ACHS  Resume glipizide  Started on Jardiance    Hypertension  Losartan p.o. leeanne       VTE Prophylaxis: Home apixaban        Ramón Adams MD    SUBJECTIVE     Seen and examined   Clinically better   No complaints     OBJECTIVE:     Last Recorded Vitals:  Vitals:    10/13/24 2000 10/13/24 2314 10/14/24 0354 10/14/24 0809   BP: 134/71 142/82 124/69 116/78   BP Location: Right arm   Right arm   Patient Position: Sitting   Lying   Pulse:  83 83    Resp: 18  17 19   Temp: 36.5 °C (97.7 °F) 36.4 °C (97.5 °F) 36.3 °C (97.3 °F) 36.7 °C (98.1 °F)   TempSrc: Temporal   Temporal   SpO2: 96% 94% 93% 92%   Weight:       Height:         Last I/O:  I/O last 3 completed shifts:  In: 1300 (8.6 mL/kg) [P.O.:1300]  Out: 3105 (20.7 mL/kg) [Urine:3105 (0.6 mL/kg/hr)]  Weight: 150.3 kg     Physical Exam  Vitals reviewed.   Constitutional:       General: He is not in acute distress.     Appearance: He is obese. He is not ill-appearing.   HENT:      Head: Normocephalic and atraumatic.      Mouth/Throat:      Comments: Tracheostomy present.  Eyes:      Extraocular Movements: Extraocular movements intact.      Conjunctiva/sclera: Conjunctivae normal.   Cardiovascular:      Rate and Rhythm: Normal rate and regular rhythm.       Pulses: Normal pulses.      Heart sounds: Normal heart sounds.   Pulmonary:      Effort: Pulmonary effort is normal.      Comments: No acute respiratory distress.  Breath sounds diminished auscultation bilaterally.  Abdominal:      General: Bowel sounds are normal. There is no distension.      Palpations: Abdomen is soft.      Tenderness: There is no abdominal tenderness. There is no guarding.   Musculoskeletal:         General: No tenderness. Normal range of motion.      Cervical back: Normal range of motion and neck supple.      Right lower leg: Edema present.      Left lower leg: Edema present.   Neurological:      General: No focal deficit present.      Mental Status: He is alert and oriented to person, place, and time. Mental status is at baseline.   Psychiatric:         Mood and Affect: Mood normal.         Behavior: Behavior normal.       Inpatient Medications:  apixaban, 5 mg, oral, BID  empagliflozin, 10 mg, oral, Daily  furosemide, 20 mg, oral, BID  glipiZIDE XL, 20 mg, oral, Daily  insulin lispro, 0-10 Units, subcutaneous, Before meals & nightly  losartan, 50 mg, oral, Daily  magnesium oxide, 400 mg, oral, Daily  metoprolol tartrate, 75 mg, oral, BID  nystatin, 1 Application, Topical, BID  polyethylene glycol, 17 g, oral, Daily  sotalol, 120 mg, oral, BID    PRN Medications  PRN medications: acetaminophen **OR** acetaminophen **OR** acetaminophen, dextrose, dextrose, glucagon, glucagon  Continuous Medications:  oxygen, , Last Rate: 8 L/min (10/09/24 1326)      LABS AND IMAGING:     Labs:  Results from last 7 days   Lab Units 10/14/24  0525 10/13/24  0425 10/12/24  0446   WBC AUTO x10*3/uL 6.6 6.9 6.6   RBC AUTO x10*6/uL 5.60 5.51 5.62   HEMOGLOBIN g/dL 14.9 14.8 15.1   HEMATOCRIT % 50.1 48.8 49.8   MCV fL 90 89 89   MCH pg 26.6 26.9 26.9   MCHC g/dL 29.7* 30.3* 30.3*   RDW % 15.1* 15.4* 15.8*   PLATELETS AUTO x10*3/uL 193 193 195     Results from last 7 days   Lab Units 10/14/24  0525 10/13/24  0425  10/12/24  0446 10/10/24  0433 10/09/24  1326   SODIUM mmol/L 136 140 138   < > 137   POTASSIUM mmol/L 4.4 4.2 4.1   < > 4.1   CHLORIDE mmol/L 98 100 96*   < > 100   CO2 mmol/L 34* 35* 36*   < > 30   BUN mg/dL 21 27* 31*   < > 22   CREATININE mg/dL 1.07 1.03 1.18   < > 0.98   GLUCOSE mg/dL 96 106* 87   < > 124*   PROTEIN TOTAL g/dL  --   --   --   --  7.0   CALCIUM mg/dL 8.9 8.6 8.8   < > 9.4   BILIRUBIN TOTAL mg/dL  --   --   --   --  1.1   ALK PHOS U/L  --   --   --   --  41   AST U/L  --   --   --   --  16   ALT U/L  --   --   --   --  13    < > = values in this interval not displayed.     Results from last 7 days   Lab Units 10/12/24  0446 10/11/24  0425 10/10/24  0433   MAGNESIUM mg/dL 2.24 1.88 1.81     Results from last 7 days   Lab Units 10/09/24  1543 10/09/24  1326   TROPHS ng/L 12 10     Imaging:  ECG 12 lead  Atrial fibrillation  Left axis deviation  Pulmonary disease pattern  Abnormal ECG  When compared with ECG of 14-OCT-2024 01:04, (unconfirmed)  No significant change was found  Confirmed by Zach Mulligan [id number]) on 10/14/2024 9:47:24 AM  ECG 12 lead  Atrial fibrillation with premature ventricular or aberrantly conducted complexes  Left axis deviation  Anterior infarct , age undetermined  Abnormal ECG  When compared with ECG of 13-OCT-2024 13:48, (unconfirmed)  No significant change was found  Confirmed by Zach Mulligan [id number]) on 10/14/2024 9:45:17 AM  ECG 12 lead  Atrial fibrillation  Left axis deviation  Abnormal ECG  When compared with ECG of 13-OCT-2024 13:49, (unconfirmed)  No significant change was found  Confirmed by Zach Mulligan ([id number]) on 10/14/2024 9:42:59 AM

## 2024-10-15 ENCOUNTER — HOSPITAL ENCOUNTER (OUTPATIENT)
Dept: CARDIOLOGY | Facility: HOSPITAL | Age: 49
Discharge: HOME | End: 2024-10-15
Payer: COMMERCIAL

## 2024-10-15 PROCEDURE — 93005 ELECTROCARDIOGRAM TRACING: CPT

## 2024-10-16 ENCOUNTER — APPOINTMENT (OUTPATIENT)
Dept: CARDIOLOGY | Facility: CLINIC | Age: 49
End: 2024-10-16

## 2024-10-16 LAB
ATRIAL RATE: 77 BPM
P AXIS: 39 DEGREES
P OFFSET: 189 MS
P ONSET: 120 MS
PR INTERVAL: 188 MS
Q ONSET: 214 MS
QRS COUNT: 12 BEATS
QRS DURATION: 96 MS
QT INTERVAL: 416 MS
QTC CALCULATION(BAZETT): 470 MS
QTC FREDERICIA: 452 MS
R AXIS: -44 DEGREES
T AXIS: 36 DEGREES
T OFFSET: 422 MS
VENTRICULAR RATE: 77 BPM

## 2024-10-17 ENCOUNTER — TELEPHONE (OUTPATIENT)
Dept: INPATIENT UNIT | Facility: HOSPITAL | Age: 49
End: 2024-10-17
Payer: COMMERCIAL

## 2024-10-17 NOTE — NURSING NOTE
Attempted to reach patient for follow up. No answer. Message left with call back information. Call made by Congestive Heart Failure Clinical Nurse Navigator.

## 2024-10-18 ENCOUNTER — TELEPHONE (OUTPATIENT)
Dept: INPATIENT UNIT | Facility: HOSPITAL | Age: 49
End: 2024-10-18
Payer: COMMERCIAL

## 2024-10-18 NOTE — NURSING NOTE
Attempted to reach patient again for follow up. No answer. Message left with call back information. Call made by Congestive Heart Failure Clinical Nurse Navigator.

## 2024-10-22 NOTE — DOCUMENTATION CLARIFICATION NOTE
"    PATIENT:               MACEY SANTIAGO  ACCT #:                  [id number]  MRN:                       [id number]  :                       1975  ADMIT DATE:       10/9/2024 12:19 PM  DISCH DATE:        10/14/2024 6:08 PM  RESPONDING PROVIDER #:        [id number]          PROVIDER RESPONSE TEXT:    Acute on Chronic Hypoxemic Respiratory Failure    CDI QUERY TEXT:    Clarification    Instruction:    Based on your assessment of the patient and the clinical information, please provide the requested documentation by clicking on the appropriate radio button and enter any additional information if prompted.    Question: Is there a diagnosis indicative of the clinical information    When answering this query, please exercise your independent professional judgment. The fact that a question is being asked, does not imply that any particular answer is desired or expected.    The patient's clinical indicators include:  Clinical Information: ED: \"48-year-old male patient with history of trach secondary to sleep apnea comes into the emergency department today with complaints of increasing shortness of breath as well as exertional dyspnea\"    Clinical Indicators: ED: \"Given his hypoxic respiratory failure with pulmonary edema I recommend admission for further evaluation and treatment.  Patient treated with IV Lasix.\"  ED: \" He is requiring supplemental oxygen which she usually only needs at night.\"    H&P: \"severe HERLINDA s/p t-tube\"    VS day of admission:  Temp 36.6 HR   RR 18-22, -180/83-99, O2 sat 88-96%  O2 8 liters trach tube    CXR 10/9: \"1.Cardiomegaly. 2.Irregular interstitial prominence greatest toward the lung bases may represent vascular congestion/edema. Infection not excluded.\"    MAR: Lasix IV    Treatment: monitoring, CXR, O2, IV Lasix    Risk Factors: Uses O2 at night, increased O2 needs, SOB  Options provided:  -- Acute on Chronic Hypoxemic Respiratory Failure  -- Other - I will add my own " diagnosis  -- Refer to Clinical Documentation Reviewer    Query created by: Aicha Hanson on 10/16/2024 12:15 PM      Electronically signed by:  JOSE MALDONADO MD 10/22/2024 3:21 PM

## 2024-10-24 ENCOUNTER — APPOINTMENT (OUTPATIENT)
Dept: CARDIOLOGY | Facility: CLINIC | Age: 49
End: 2024-10-24
Payer: COMMERCIAL

## 2024-10-28 ENCOUNTER — APPOINTMENT (OUTPATIENT)
Dept: CARDIOLOGY | Facility: CLINIC | Age: 49
End: 2024-10-28
Payer: COMMERCIAL

## 2024-10-29 ENCOUNTER — APPOINTMENT (OUTPATIENT)
Dept: CARDIOLOGY | Facility: CLINIC | Age: 49
End: 2024-10-29
Payer: COMMERCIAL

## 2024-11-06 ENCOUNTER — LAB (OUTPATIENT)
Dept: LAB | Facility: LAB | Age: 49
End: 2024-11-06
Payer: COMMERCIAL

## 2024-11-06 ENCOUNTER — APPOINTMENT (OUTPATIENT)
Dept: CARDIOLOGY | Facility: CLINIC | Age: 49
End: 2024-11-06
Payer: COMMERCIAL

## 2024-11-06 VITALS
HEART RATE: 79 BPM | HEIGHT: 72 IN | BODY MASS INDEX: 42.66 KG/M2 | SYSTOLIC BLOOD PRESSURE: 132 MMHG | WEIGHT: 315 LBS | DIASTOLIC BLOOD PRESSURE: 84 MMHG

## 2024-11-06 DIAGNOSIS — I48.0 PAROXYSMAL ATRIAL FIBRILLATION (MULTI): ICD-10-CM

## 2024-11-06 DIAGNOSIS — Z51.81 ANTICOAGULATION MANAGEMENT ENCOUNTER: ICD-10-CM

## 2024-11-06 DIAGNOSIS — Z78.9 NEVER SMOKED TOBACCO: ICD-10-CM

## 2024-11-06 DIAGNOSIS — R94.31 ABNORMAL EKG: ICD-10-CM

## 2024-11-06 DIAGNOSIS — Z79.01 ANTICOAGULATION MANAGEMENT ENCOUNTER: ICD-10-CM

## 2024-11-06 DIAGNOSIS — Z79.899 HIGH RISK MEDICATION USE: Primary | ICD-10-CM

## 2024-11-06 DIAGNOSIS — I48.91 ATRIAL FIBRILLATION (MULTI): ICD-10-CM

## 2024-11-06 LAB
ANION GAP SERPL CALC-SCNC: 10 MMOL/L (ref 10–20)
BUN SERPL-MCNC: 20 MG/DL (ref 6–23)
CALCIUM SERPL-MCNC: 9 MG/DL (ref 8.6–10.3)
CHLORIDE SERPL-SCNC: 101 MMOL/L (ref 98–107)
CO2 SERPL-SCNC: 32 MMOL/L (ref 21–32)
CREAT SERPL-MCNC: 0.98 MG/DL (ref 0.5–1.3)
EGFRCR SERPLBLD CKD-EPI 2021: >90 ML/MIN/1.73M*2
ERYTHROCYTE [DISTWIDTH] IN BLOOD BY AUTOMATED COUNT: 16.6 % (ref 11.5–14.5)
GLUCOSE SERPL-MCNC: 137 MG/DL (ref 74–99)
HCT VFR BLD AUTO: 54.5 % (ref 41–52)
HGB BLD-MCNC: 16.5 G/DL (ref 13.5–17.5)
INR PPP: 1.2 (ref 0.9–1.1)
MCH RBC QN AUTO: 26.7 PG (ref 26–34)
MCHC RBC AUTO-ENTMCNC: 30.3 G/DL (ref 32–36)
MCV RBC AUTO: 88 FL (ref 80–100)
NRBC BLD-RTO: 0 /100 WBCS (ref 0–0)
PLATELET # BLD AUTO: 173 X10*3/UL (ref 150–450)
POTASSIUM SERPL-SCNC: 5.2 MMOL/L (ref 3.5–5.3)
PROTHROMBIN TIME: 13.9 SECONDS (ref 9.8–12.8)
RBC # BLD AUTO: 6.18 X10*6/UL (ref 4.5–5.9)
SODIUM SERPL-SCNC: 138 MMOL/L (ref 136–145)
WBC # BLD AUTO: 7 X10*3/UL (ref 4.4–11.3)

## 2024-11-06 PROCEDURE — 85610 PROTHROMBIN TIME: CPT

## 2024-11-06 PROCEDURE — 85027 COMPLETE CBC AUTOMATED: CPT

## 2024-11-06 PROCEDURE — 3048F LDL-C <100 MG/DL: CPT | Performed by: INTERNAL MEDICINE

## 2024-11-06 PROCEDURE — 3075F SYST BP GE 130 - 139MM HG: CPT | Performed by: INTERNAL MEDICINE

## 2024-11-06 PROCEDURE — 4010F ACE/ARB THERAPY RXD/TAKEN: CPT | Performed by: INTERNAL MEDICINE

## 2024-11-06 PROCEDURE — 99215 OFFICE O/P EST HI 40 MIN: CPT | Performed by: INTERNAL MEDICINE

## 2024-11-06 PROCEDURE — 93000 ELECTROCARDIOGRAM COMPLETE: CPT | Performed by: INTERNAL MEDICINE

## 2024-11-06 PROCEDURE — 80048 BASIC METABOLIC PNL TOTAL CA: CPT

## 2024-11-06 PROCEDURE — 36415 COLL VENOUS BLD VENIPUNCTURE: CPT

## 2024-11-06 PROCEDURE — 3008F BODY MASS INDEX DOCD: CPT | Performed by: INTERNAL MEDICINE

## 2024-11-06 PROCEDURE — 3079F DIAST BP 80-89 MM HG: CPT | Performed by: INTERNAL MEDICINE

## 2024-11-06 RX ORDER — SOTALOL HYDROCHLORIDE 120 MG/1
180 TABLET ORAL 2 TIMES DAILY
Qty: 270 TABLET | Refills: 3 | Status: SHIPPED | OUTPATIENT
Start: 2024-11-06 | End: 2025-11-01

## 2024-11-06 RX ORDER — SITAGLIPTIN 100 MG/1
1 TABLET, FILM COATED ORAL
COMMUNITY
Start: 2023-11-15

## 2024-11-06 RX ORDER — LISINOPRIL 20 MG/1
20 TABLET ORAL DAILY
COMMUNITY
Start: 2024-10-13

## 2024-11-06 ASSESSMENT — ENCOUNTER SYMPTOMS
DYSPNEA ON EXERTION: 0
PALPITATIONS: 0

## 2024-11-06 NOTE — PROGRESS NOTES
CARDIOLOGY OFFICE VISIT      CHIEF COMPLAINT  Chief Complaint   Patient presents with    Follow-up     [facility] discharge       HISTORY OF PRESENT ILLNESS  HPI  48 y.o. male presenting with palpitations during admission at [facility] October 10, 2024.     Past medical history diabetes mellitus severe obstructive sleep apnea status post T-tube, history of atrial fibrillation.  Patient presented to the hospital complaining of shortness of breath.  Apparently he has been using more diuretic therapy recently and also palpitations.     Patient states that he was diagnosed of atrial fibrillation at least 5 years ago.  He was placed on sotalol therapy     Due to financial reasons, he discontinued this medication.  He is basically recently seen at [facility] for management for atrial fibrillation.  He was cardioverted with evidence of recurrence of atrial fibrillation.  There was a discussion about putting him on antiarrhythmic therapy but patient lost follow-up with [facility].  Echocardiogram in June 2022 shows left intervention fraction 70% with trivial to respiratory rotation.  Event monitor 2022 shows predominant rhythm sinus rhythm.  No evidence of atrial fibrillation.  No evidence of bradycardia or pauses.     EKG on arrival shows atrial fibrillation rate of 99 bpm QRS duration 86 ms QT corrected 444 ms.  Laboratory data on admission shows sodium 136 potassium 3.9 BUN 21 creatinine 0.94 GFR more than 90 LDL 64 magnesium 1.181 WBC 7.7 hemoglobin 14.8 hematocrit 40.6 platelet count 185.     Echocardiogram October 2024  CONCLUSIONS:   1. The left ventricular systolic function is normal, with a visually estimated ejection fraction of 70%.   2. Spectral Doppler shows an abnormal pattern of left ventricular diastolic filling.   3. There is moderate concentric left ventricular hypertrophy.   4. There is normal right ventricular global systolic function.   5. RVSP 33 mmHg.   6. The left atrium is mild to  moderately dilated.   7. Slightly elevated right ventricular systolic pressure.   8. Normal valve structure and function.    Patient was reloaded with sotalol therapy at a dose of 120 mg twice a day.  Patient went electrocardioversion with successful conversion to sinus rhythm October 14, 2024.    Patient states that after discharge from the hospital he was doing well until few days ago he started noticing getting more tired and fatigued doing his daily activities.  Also he is gradually noticing some numbness and tingling sensation in both arms.    EKG performed today shows atrial fibrillation at a rate of 79 bpm QRS duration 86 ms QT corrected 442 ms.  Rhythm strip shows the same pattern.          Past Medical History  Past Medical History:   Diagnosis Date    COPD (chronic obstructive pulmonary disease) (Multi)        Social History  Social History     Tobacco Use    Smoking status: Never     Passive exposure: Never    Smokeless tobacco: Current     Types: Chew   Vaping Use    Vaping status: Never Used   Substance Use Topics    Alcohol use: Not Currently    Drug use: Defer       Family History   No family history on file.     Allergies:  No Known Allergies     Outpatient Medications:  Current Outpatient Medications   Medication Instructions    Eliquis 5 mg, 2 times daily    furosemide (LASIX) 20 mg, oral, 2 times daily (morning and late afternoon)    glipiZIDE XL (GLUCOTROL XL) 20 mg, Daily    Januvia 100 mg tablet 1 tablet, Daily (0630)    Jardiance 10 mg, oral, Daily    lisinopril 20 mg, Daily    losartan (COZAAR) 50 mg, oral, Daily, Replaces lisinopril    magnesium oxide (MAG-OX) 400 mg, oral, Daily    metoprolol tartrate (LOPRESSOR) 75 mg, oral, 2 times daily    Mounjaro 15 mg/0.5 mL pen injector Inject under the skin.    sotalol (BETAPACE) 120 mg, oral, 2 times daily          REVIEW OF SYSTEMS  Review of Systems   Cardiovascular:  Negative for chest pain, dyspnea on exertion and palpitations.   All other  systems reviewed and are negative.        VITALS  Vitals:    11/06/24 0948   BP: 132/84   Pulse: 79       PHYSICAL EXAM  Constitutional:       General: Awake.      Appearance: Normal and healthy appearance. Well-developed and not in distress. Morbidly obese.   Neck:      Vascular: No JVR. JVD normal.      Trachea: Tracheostomy present.   Pulmonary:      Effort: Pulmonary effort is normal.      Breath sounds: Normal breath sounds. No wheezing. No rhonchi. No rales.   Chest:      Chest wall: Not tender to palpatation.   Cardiovascular:      PMI at left midclavicular line. Normal rate. Regular rhythm. Normal S1. Normal S2.       Murmurs: There is no murmur.      No gallop.  No click. No rub.   Pulses:     Intact distal pulses.   Edema:     Peripheral edema absent.   Abdominal:      Tenderness: There is no abdominal tenderness.   Musculoskeletal: Normal range of motion.         General: No tenderness. Skin:     General: Skin is warm and dry.   Neurological:      General: No focal deficit present.      Mental Status: Alert and oriented to person, place and time.           ASSESSMENT AND PLAN  Acute on chronic heart failure with preserved ejection fraction   Persistent atrial fibrillation  Hypomagnesia on admit, improved with replacement   Anticoagulation with Eliquis  Hypertension  Morbid obesity  Severe sleep apnea requiring tracheostomy  Type 2 diabetes mellitus  Chronic use of chewing tobacco    Plan recommendations    Patient is back in atrial fibrillation.  We will increase the dose of sotalol to 120 mg 1 tablet twice a day starting Sunday.  He will present to the office in [place] for an EKG and also in our office at [place] on Tuesday with the possibility of cardioversion he still in atrial fibrillation.    Follow my office 3 to 4 weeks post cardioversion or sooner needed. Procedure, risk, benefits and possible complications were explained to patient.  All questions were answered.  Patient agrees with plan.   Informed consent was signed.      Patient is on Eliquis therapy 4 mg 1 tablet twice a day.    Continue with beta-blocker therapy.    Hold Mounjaro.    Hold Jardiance 3 days prior to procedure.    Risk factor modification and lifestyle modification discussed with patient. Diet , exercise and hydration discussed with patient.    I have personally review with patient during this office visit, laboratory data, echocardiogram results, stress test results, Holter-event monitor results prior and after the last electrophysiology visit. All questions has been answered.    Please excuse any errors in grammar or translation related to this dictation.  Voice recognition software was utilized to prepare this document.

## 2024-11-06 NOTE — PATIENT INSTRUCTIONS
Continue same medications/treatment.  Patient educated on proper medication use.  Patient educated on risk factor modification.  Please bring any lab results from other providers/physicians to your next appointment.    Please bring all medicines, vitamins, and herbal supplements with you when you come to the office.    Prescriptions will not be filled unless you are compliant with your follow up appointments or have a follow up appointment scheduled as per instruction of your physician. Refills should be requested at the time of your visit.    INCREASE sotalol to 1.5 tablets twice daily STARTING SUNDAY  SCHEDULE in clinic EKG at [place] on Monday  SCHEDULE cardioversion for Tuesday with Dr. Avila. Obtain labs within the next week. Orders are in the system.   HOLD Mounjaro for 1 week prior to procedure.   HOLD Jardiance for 3 days prior to procedure.   No need to hold glipizide per Dr. Avila.     Juliana FIELDS RN, AM SCRIBING FOR, AND IN THE PRESENCE OF DR. HALIMA AVILA MD

## 2024-11-06 NOTE — H&P (VIEW-ONLY)
CARDIOLOGY OFFICE VISIT      CHIEF COMPLAINT  Chief Complaint   Patient presents with    Follow-up     [facility] discharge       HISTORY OF PRESENT ILLNESS  HPI  48 y.o. male presenting with palpitations during admission at [facility] October 10, 2024.     Past medical history diabetes mellitus severe obstructive sleep apnea status post T-tube, history of atrial fibrillation.  Patient presented to the hospital complaining of shortness of breath.  Apparently he has been using more diuretic therapy recently and also palpitations.     Patient states that he was diagnosed of atrial fibrillation at least 5 years ago.  He was placed on sotalol therapy     Due to financial reasons, he discontinued this medication.  He is basically recently seen at [facility] for management for atrial fibrillation.  He was cardioverted with evidence of recurrence of atrial fibrillation.  There was a discussion about putting him on antiarrhythmic therapy but patient lost follow-up with [facility].  Echocardiogram in June 2022 shows left intervention fraction 70% with trivial to respiratory rotation.  Event monitor 2022 shows predominant rhythm sinus rhythm.  No evidence of atrial fibrillation.  No evidence of bradycardia or pauses.     EKG on arrival shows atrial fibrillation rate of 99 bpm QRS duration 86 ms QT corrected 444 ms.  Laboratory data on admission shows sodium 136 potassium 3.9 BUN 21 creatinine 0.94 GFR more than 90 LDL 64 magnesium 1.181 WBC 7.7 hemoglobin 14.8 hematocrit 40.6 platelet count 185.     Echocardiogram October 2024  CONCLUSIONS:   1. The left ventricular systolic function is normal, with a visually estimated ejection fraction of 70%.   2. Spectral Doppler shows an abnormal pattern of left ventricular diastolic filling.   3. There is moderate concentric left ventricular hypertrophy.   4. There is normal right ventricular global systolic function.   5. RVSP 33 mmHg.   6. The left atrium is mild to  moderately dilated.   7. Slightly elevated right ventricular systolic pressure.   8. Normal valve structure and function.    Patient was reloaded with sotalol therapy at a dose of 120 mg twice a day.  Patient went electrocardioversion with successful conversion to sinus rhythm October 14, 2024.    Patient states that after discharge from the hospital he was doing well until few days ago he started noticing getting more tired and fatigued doing his daily activities.  Also he is gradually noticing some numbness and tingling sensation in both arms.    EKG performed today shows atrial fibrillation at a rate of 79 bpm QRS duration 86 ms QT corrected 442 ms.  Rhythm strip shows the same pattern.          Past Medical History  Past Medical History:   Diagnosis Date    COPD (chronic obstructive pulmonary disease) (Multi)        Social History  Social History     Tobacco Use    Smoking status: Never     Passive exposure: Never    Smokeless tobacco: Current     Types: Chew   Vaping Use    Vaping status: Never Used   Substance Use Topics    Alcohol use: Not Currently    Drug use: Defer       Family History   No family history on file.     Allergies:  No Known Allergies     Outpatient Medications:  Current Outpatient Medications   Medication Instructions    Eliquis 5 mg, 2 times daily    furosemide (LASIX) 20 mg, oral, 2 times daily (morning and late afternoon)    glipiZIDE XL (GLUCOTROL XL) 20 mg, Daily    Januvia 100 mg tablet 1 tablet, Daily (0630)    Jardiance 10 mg, oral, Daily    lisinopril 20 mg, Daily    losartan (COZAAR) 50 mg, oral, Daily, Replaces lisinopril    magnesium oxide (MAG-OX) 400 mg, oral, Daily    metoprolol tartrate (LOPRESSOR) 75 mg, oral, 2 times daily    Mounjaro 15 mg/0.5 mL pen injector Inject under the skin.    sotalol (BETAPACE) 120 mg, oral, 2 times daily          REVIEW OF SYSTEMS  Review of Systems   Cardiovascular:  Negative for chest pain, dyspnea on exertion and palpitations.   All other  systems reviewed and are negative.        VITALS  Vitals:    11/06/24 0948   BP: 132/84   Pulse: 79       PHYSICAL EXAM  Constitutional:       General: Awake.      Appearance: Normal and healthy appearance. Well-developed and not in distress. Morbidly obese.   Neck:      Vascular: No JVR. JVD normal.      Trachea: Tracheostomy present.   Pulmonary:      Effort: Pulmonary effort is normal.      Breath sounds: Normal breath sounds. No wheezing. No rhonchi. No rales.   Chest:      Chest wall: Not tender to palpatation.   Cardiovascular:      PMI at left midclavicular line. Normal rate. Regular rhythm. Normal S1. Normal S2.       Murmurs: There is no murmur.      No gallop.  No click. No rub.   Pulses:     Intact distal pulses.   Edema:     Peripheral edema absent.   Abdominal:      Tenderness: There is no abdominal tenderness.   Musculoskeletal: Normal range of motion.         General: No tenderness. Skin:     General: Skin is warm and dry.   Neurological:      General: No focal deficit present.      Mental Status: Alert and oriented to person, place and time.           ASSESSMENT AND PLAN  Acute on chronic heart failure with preserved ejection fraction   Persistent atrial fibrillation  Hypomagnesia on admit, improved with replacement   Anticoagulation with Eliquis  Hypertension  Morbid obesity  Severe sleep apnea requiring tracheostomy  Type 2 diabetes mellitus  Chronic use of chewing tobacco    Plan recommendations    Patient is back in atrial fibrillation.  We will increase the dose of sotalol to 120 mg 1 tablet twice a day starting Sunday.  He will present to the office in [place] for an EKG and also in our office at [place] on Tuesday with the possibility of cardioversion he still in atrial fibrillation.    Follow my office 3 to 4 weeks post cardioversion or sooner needed. Procedure, risk, benefits and possible complications were explained to patient.  All questions were answered.  Patient agrees with plan.   Informed consent was signed.      Patient is on Eliquis therapy 4 mg 1 tablet twice a day.    Continue with beta-blocker therapy.    Hold Mounjaro.    Hold Jardiance 3 days prior to procedure.    Risk factor modification and lifestyle modification discussed with patient. Diet , exercise and hydration discussed with patient.    I have personally review with patient during this office visit, laboratory data, echocardiogram results, stress test results, Holter-event monitor results prior and after the last electrophysiology visit. All questions has been answered.    Please excuse any errors in grammar or translation related to this dictation.  Voice recognition software was utilized to prepare this document.

## 2024-11-11 PROCEDURE — 93010 ELECTROCARDIOGRAM REPORT: CPT | Performed by: INTERNAL MEDICINE

## 2024-11-12 ENCOUNTER — HOSPITAL ENCOUNTER (OUTPATIENT)
Dept: CARDIOLOGY | Facility: HOSPITAL | Age: 49
Discharge: HOME | End: 2024-11-12
Payer: COMMERCIAL

## 2024-11-12 ENCOUNTER — ANESTHESIA (OUTPATIENT)
Dept: CARDIOLOGY | Facility: HOSPITAL | Age: 49
End: 2024-11-12
Payer: COMMERCIAL

## 2024-11-12 ENCOUNTER — ANESTHESIA EVENT (OUTPATIENT)
Dept: CARDIOLOGY | Facility: HOSPITAL | Age: 49
End: 2024-11-12
Payer: COMMERCIAL

## 2024-11-12 VITALS
RESPIRATION RATE: 20 BRPM | TEMPERATURE: 98.2 F | HEIGHT: 72 IN | HEART RATE: 105 BPM | DIASTOLIC BLOOD PRESSURE: 91 MMHG | WEIGHT: 315 LBS | SYSTOLIC BLOOD PRESSURE: 119 MMHG | BODY MASS INDEX: 42.66 KG/M2 | OXYGEN SATURATION: 92 %

## 2024-11-12 DIAGNOSIS — I48.0 PAROXYSMAL ATRIAL FIBRILLATION (MULTI): ICD-10-CM

## 2024-11-12 LAB
ANION GAP SERPL CALC-SCNC: 12 MMOL/L (ref 10–20)
APTT PPP: 35 SECONDS (ref 27–38)
ATRIAL RATE: 81 BPM
ATRIAL RATE: 93 BPM
BODY SURFACE AREA: 2.74 M2
BUN SERPL-MCNC: 28 MG/DL (ref 6–23)
CALCIUM SERPL-MCNC: 9.1 MG/DL (ref 8.6–10.3)
CHLORIDE SERPL-SCNC: 98 MMOL/L (ref 98–107)
CO2 SERPL-SCNC: 32 MMOL/L (ref 21–32)
CREAT SERPL-MCNC: 1 MG/DL (ref 0.5–1.3)
EGFRCR SERPLBLD CKD-EPI 2021: >90 ML/MIN/1.73M*2
GLUCOSE SERPL-MCNC: 181 MG/DL (ref 74–99)
INR PPP: 1.3 (ref 0.9–1.1)
P AXIS: 33 DEGREES
P OFFSET: 191 MS
P ONSET: 131 MS
POTASSIUM SERPL-SCNC: 4.6 MMOL/L (ref 3.5–5.3)
PR INTERVAL: 168 MS
PROTHROMBIN TIME: 14.4 SECONDS (ref 9.8–12.8)
Q ONSET: 215 MS
Q ONSET: 215 MS
QRS COUNT: 13 BEATS
QRS COUNT: 18 BEATS
QRS DURATION: 86 MS
QRS DURATION: 88 MS
QT INTERVAL: 328 MS
QT INTERVAL: 414 MS
QTC CALCULATION(BAZETT): 441 MS
QTC CALCULATION(BAZETT): 480 MS
QTC FREDERICIA: 400 MS
QTC FREDERICIA: 457 MS
R AXIS: -44 DEGREES
R AXIS: -64 DEGREES
SODIUM SERPL-SCNC: 137 MMOL/L (ref 136–145)
T AXIS: 33 DEGREES
T AXIS: 48 DEGREES
T OFFSET: 379 MS
T OFFSET: 422 MS
VENTRICULAR RATE: 109 BPM
VENTRICULAR RATE: 81 BPM

## 2024-11-12 PROCEDURE — 2500000004 HC RX 250 GENERAL PHARMACY W/ HCPCS (ALT 636 FOR OP/ED): Performed by: ANESTHESIOLOGY

## 2024-11-12 PROCEDURE — 85610 PROTHROMBIN TIME: CPT | Performed by: NURSE PRACTITIONER

## 2024-11-12 PROCEDURE — 7100000001 HC RECOVERY ROOM TIME - INITIAL BASE CHARGE: Performed by: INTERNAL MEDICINE

## 2024-11-12 PROCEDURE — 3700000001 HC GENERAL ANESTHESIA TIME - INITIAL BASE CHARGE: Performed by: INTERNAL MEDICINE

## 2024-11-12 PROCEDURE — 7100000002 HC RECOVERY ROOM TIME - EACH INCREMENTAL 1 MINUTE: Performed by: INTERNAL MEDICINE

## 2024-11-12 PROCEDURE — 92960 CARDIOVERSION ELECTRIC EXT: CPT | Mod: 59

## 2024-11-12 PROCEDURE — 92960 CARDIOVERSION ELECTRIC EXT: CPT | Performed by: INTERNAL MEDICINE

## 2024-11-12 PROCEDURE — 93005 ELECTROCARDIOGRAM TRACING: CPT

## 2024-11-12 PROCEDURE — 36415 COLL VENOUS BLD VENIPUNCTURE: CPT | Performed by: NURSE PRACTITIONER

## 2024-11-12 PROCEDURE — 80048 BASIC METABOLIC PNL TOTAL CA: CPT | Performed by: NURSE PRACTITIONER

## 2024-11-12 PROCEDURE — 3700000002 HC GENERAL ANESTHESIA TIME - EACH INCREMENTAL 1 MINUTE: Performed by: INTERNAL MEDICINE

## 2024-11-12 PROCEDURE — 7100000009 HC PHASE TWO TIME - INITIAL BASE CHARGE: Performed by: INTERNAL MEDICINE

## 2024-11-12 RX ORDER — PROPOFOL 10 MG/ML
INJECTION, EMULSION INTRAVENOUS AS NEEDED
Status: DISCONTINUED | OUTPATIENT
Start: 2024-11-12 | End: 2024-11-12

## 2024-11-12 ASSESSMENT — PAIN SCALES - GENERAL: PAIN_LEVEL: 0

## 2024-11-12 ASSESSMENT — COLUMBIA-SUICIDE SEVERITY RATING SCALE - C-SSRS
1. IN THE PAST MONTH, HAVE YOU WISHED YOU WERE DEAD OR WISHED YOU COULD GO TO SLEEP AND NOT WAKE UP?: NO
6. HAVE YOU EVER DONE ANYTHING, STARTED TO DO ANYTHING, OR PREPARED TO DO ANYTHING TO END YOUR LIFE?: NO
2. HAVE YOU ACTUALLY HAD ANY THOUGHTS OF KILLING YOURSELF?: NO

## 2024-11-12 NOTE — ANESTHESIA PREPROCEDURE EVALUATION
Ger Roberts is a 49 y.o. male here for:      Cardioversion External  With * No providers found *  Paroxysmal atrial fibrillation (Multi)    Lab Results   Component Value Date    HGB 16.5 11/06/2024    HCT 54.5 (H) 11/06/2024    WBC 7.0 11/06/2024     11/06/2024     11/12/2024    K 4.6 11/12/2024    CL 98 11/12/2024    CREATININE 1.00 11/12/2024    BUN 28 (H) 11/12/2024       Social History     Substance and Sexual Activity   Drug Use Defer      Tobacco Use: High Risk (11/6/2024)    Patient History     Smoking Tobacco Use: Never     Smokeless Tobacco Use: Current     Passive Exposure: Never      Social History     Substance and Sexual Activity   Alcohol Use Not Currently        No Known Allergies    Current Outpatient Medications   Medication Instructions    Eliquis 5 mg, 2 times daily    furosemide (LASIX) 20 mg, oral, 2 times daily (morning and late afternoon)    glipiZIDE XL (GLUCOTROL XL) 20 mg, Daily    Januvia 100 mg tablet 1 tablet, Daily (0630)    Jardiance 10 mg, oral, Daily    losartan (COZAAR) 50 mg, oral, Daily, Replaces lisinopril    magnesium oxide (MAG-OX) 400 mg, oral, Daily    metoprolol tartrate (LOPRESSOR) 75 mg, oral, 2 times daily    Mounjaro 15 mg/0.5 mL pen injector Inject under the skin.    sotalol (BETAPACE) 180 mg, oral, 2 times daily       Past Medical History:   Diagnosis Date    Arrhythmia     A-Fib    COPD (chronic obstructive pulmonary disease) (Multi)     Diabetes mellitus (Multi)     Hypertension     HERLINDA (obstructive sleep apnea)        Past Surgical History:   Procedure Laterality Date    CARDIOVERSION      KNEE         No family history on file.    Relevant Problems   Cardiac   (+) Atrial fibrillation (Multi)   (+) HTN (hypertension)      Pulmonary   (+) Chronic obstructive pulmonary disease (Multi)   (+) HERLINDA (obstructive sleep apnea)      Endocrine   (+) Class 3 severe obesity with body mass index (BMI) of 40.0 to 44.9 in adult   (+) Diabetes mellitus, type 2  (Multi)       Visit Vitals  BP (!) 119/91   Pulse 105   Temp 36.8 °C (98.2 °F) (Temporal)   Resp 20   Ht 1.829 m (6')   Wt 148 kg (325 lb 6.4 oz)   SpO2 92% Comment: O2 mask 95-96%   BMI 44.13 kg/m²   Smoking Status Never   BSA 2.74 m²       NPO Details:  NPO/Void Status  Date of Last Liquid: 11/12/24  Time of Last Liquid: 0600  Date of Last Solid: 11/11/24  Time of Last Solid: 2100  Time of Last Void: 0900        Physical Exam    Airway  Mallampati: II  Neck ROM: full  Comments: trach   Cardiovascular   Rhythm: irregular  Rate: abnormal     Dental   Comments: Poor dentition   Pulmonary   (+) decreased breath sounds     Abdominal   (+) obese  Abdomen: soft             Anesthesia Plan    History of general anesthesia?: yes  History of complications of general anesthesia?: no    ASA 3     MAC     intravenous induction   Anesthetic plan and risks discussed with patient.

## 2024-11-12 NOTE — Clinical Note
Patient ID band present and verified. Patient contact is in waiting room. Contact(s) present: mother. Contact name: RoseCristiana

## 2024-11-12 NOTE — NURSING NOTE
Printed discharge instructions reviewed with pt and mother including post sedation restrictions, follow up appts, home going meds resumed.  Pt instructed to read all written instructions and to call Dr Cordova with any questions or concerns.  Pt was informed of his follow up with [facility] [place] which pt stated he would probably cancel that since it's not with Dr Cordova.  He stated it's too far for him to travel when he can see the cardiology NP closer to his home. Told pt to call Dr Cordova's office with any questions or concerns with pt verbalizing understanding.  Pt was able to ambulate in room without issue or complaint.

## 2024-11-12 NOTE — DISCHARGE INSTRUCTIONS
CARDIOVERSION DISCHARGE INSTRUCTIONS-CALL DR AVILA'S OFFICE WITH ANY QUESTIONS OR CONCERNS.    FOR SUDDEN AND SEVERE CHEST PAIN, SHORTNESS OF BREATH, SIGNS OF STROKE OR CHANGES IN MENTAL STATUS YOU SHOULD CALL 911 IMMEDIATELY.    FOR NEXT 24 HOURS  - Upon discharge, you should return home and rest for the remainder of the day and evening. You do not have to stay on bed rest but should not be very active.    It is recommended a responsible adult be with you for the first 24 hours after the procedure.    - No driving for 24 hours after procedure.  Please arrange for someone to drive you home from the hospital today.  No driving until your follow-up appointment with your provider if you have had a passing out spell in the recent past or previously restricted from driving.     - Do not drive, operate machinery, or use power tools for 24 hours after your procedure.  Change positions slowly due to sedation, reduces risk of getting dizzy/lightheaded.     - Do not make any legal decisions for 24 hours after your procedure.     - Do not drink alcoholic beverages for 24 hours after your procedure.

## 2024-11-12 NOTE — ANESTHESIA POSTPROCEDURE EVALUATION
Patient: Ger Roberts    Procedure Summary       Date: 11/12/24 Room / Location: [facility]    Anesthesia Start: 1003 Anesthesia Stop: 1017    Procedure: CARDIOVERSION EXTERNAL Diagnosis: Paroxysmal atrial fibrillation (Multi)    Scheduled Providers: Micha Cordova MD; Leonardo Mg MD Responsible Provider: Leonardo Mg MD    Anesthesia Type: MAC ASA Status: 3            Anesthesia Type: MAC    Vitals Value Taken Time   BP 99/57 11/12/24 1017   Temp - 11/12/24 1017   Pulse 84 11/12/24 1017   Resp 20 11/12/24 1017   SpO2 94 11/12/24 1017       Anesthesia Post Evaluation    Patient location during evaluation: bedside  Patient participation: complete - patient participated  Level of consciousness: sleepy but conscious  Pain score: 0  Pain management: adequate  Airway patency: patent  Cardiovascular status: acceptable and hemodynamically stable  Respiratory status: acceptable, spontaneous ventilation and face mask  Hydration status: acceptable  Postoperative Nausea and Vomiting: none        No notable events documented.

## 2024-12-09 ENCOUNTER — APPOINTMENT (OUTPATIENT)
Dept: CARDIOLOGY | Facility: CLINIC | Age: 49
End: 2024-12-09
Payer: COMMERCIAL

## 2024-12-13 ENCOUNTER — OFFICE VISIT (OUTPATIENT)
Dept: CARDIOLOGY | Facility: CLINIC | Age: 49
End: 2024-12-13
Payer: COMMERCIAL

## 2024-12-13 VITALS
HEART RATE: 93 BPM | BODY MASS INDEX: 42.66 KG/M2 | HEIGHT: 72 IN | SYSTOLIC BLOOD PRESSURE: 142 MMHG | DIASTOLIC BLOOD PRESSURE: 80 MMHG | WEIGHT: 315 LBS

## 2024-12-13 DIAGNOSIS — I48.91 ATRIAL FIBRILLATION, UNSPECIFIED TYPE (MULTI): ICD-10-CM

## 2024-12-13 DIAGNOSIS — I48.19 PERSISTENT ATRIAL FIBRILLATION (MULTI): ICD-10-CM

## 2024-12-13 DIAGNOSIS — Z09 HOSPITAL DISCHARGE FOLLOW-UP: Primary | ICD-10-CM

## 2024-12-13 DIAGNOSIS — I10 PRIMARY HYPERTENSION: ICD-10-CM

## 2024-12-13 DIAGNOSIS — Z79.01 CHRONIC ANTICOAGULATION: ICD-10-CM

## 2024-12-13 DIAGNOSIS — I50.33 ACUTE ON CHRONIC HEART FAILURE WITH PRESERVED EJECTION FRACTION: ICD-10-CM

## 2024-12-13 PROCEDURE — 4010F ACE/ARB THERAPY RXD/TAKEN: CPT | Performed by: NURSE PRACTITIONER

## 2024-12-13 PROCEDURE — 3048F LDL-C <100 MG/DL: CPT | Performed by: NURSE PRACTITIONER

## 2024-12-13 PROCEDURE — 93000 ELECTROCARDIOGRAM COMPLETE: CPT | Performed by: NURSE PRACTITIONER

## 2024-12-13 PROCEDURE — 3008F BODY MASS INDEX DOCD: CPT | Performed by: NURSE PRACTITIONER

## 2024-12-13 PROCEDURE — 99214 OFFICE O/P EST MOD 30 MIN: CPT | Performed by: NURSE PRACTITIONER

## 2024-12-13 PROCEDURE — 3077F SYST BP >= 140 MM HG: CPT | Performed by: NURSE PRACTITIONER

## 2024-12-13 PROCEDURE — 3079F DIAST BP 80-89 MM HG: CPT | Performed by: NURSE PRACTITIONER

## 2024-12-13 RX ORDER — LANOLIN ALCOHOL/MO/W.PET/CERES
400 CREAM (GRAM) TOPICAL DAILY
Qty: 90 TABLET | Refills: 2 | Status: SHIPPED | OUTPATIENT
Start: 2024-12-13

## 2024-12-13 RX ORDER — LOSARTAN POTASSIUM 50 MG/1
50 TABLET ORAL DAILY
Qty: 90 TABLET | Refills: 1 | Status: SHIPPED | OUTPATIENT
Start: 2024-12-13

## 2024-12-13 NOTE — PROGRESS NOTES
Ger Roberts is a 49 y.o. male that presents to the office today for hospital follow up. He  follows with  Dr. Cordova  and was added to my schedule today.      Per Dr. Cordova office notes, he has a PMH of  diabetes mellitus severe obstructive sleep apnea status post T-tube, history of atrial fibrillation.  Patient presented to the hospital complaining of shortness of breath.  Apparently he has been using more diuretic therapy recently and also palpitations.     Patient states that he was diagnosed of atrial fibrillation at least 5 years ago.  He was placed on sotalol therapy     Due to financial reasons, he discontinued this medication.  He is basically recently seen at [facility] for management for atrial fibrillation.  He was cardioverted with evidence of recurrence of atrial fibrillation.  There was a discussion about putting him on antiarrhythmic therapy but patient lost follow-up with [facility].  Echocardiogram in June 2022 shows left intervention fraction 70% with trivial to respiratory rotation.  Event monitor 2022 shows predominant rhythm sinus rhythm.  No evidence of atrial fibrillation.  No evidence of bradycardia or pauses.      Echocardiogram October 2024 showed LVEF normal.  abnormal pattern of left ventricular diastolic filling.     Patient was reloaded with sotalol therapy at a dose of 120 mg twice a day.  Patient went electrocardioversion with successful conversion to sinus rhythm October 14, 2024.     At last office visit with Dr. Cordova 11/6/2024, he states that after discharge from the hospital he was doing well until few days ago he started noticing getting more tired and fatigued doing his daily activities.  Also he is gradually noticing some numbness and tingling sensation in both arms. EKG performed  showed atrial fibrillation at a rate of 79 bpm QRS duration 86 ms QT corrected 442 ms.      Sotalol dose as increased 180 mg twice day, He underwent  successful direct current cardioversion  with Dr. Cordova at [facility] 11/12/2024. He states that overall he feels well. He states he feels he may have gone into a fib for short periods of time only a couple of times.       Testing Reviewed  EKG obtained in the office today and verified with Dr. Queen  shows NSR, left axis deviation, pulmonary disease pattern.  HR 93  bpm, QT/Qtc 374/465    CBC:   Lab Results   Component Value Date    WBC 7.0 11/06/2024    RBC 6.18 (H) 11/06/2024    HGB 16.5 11/06/2024    HCT 54.5 (H) 11/06/2024     11/06/2024        CMP:    Lab Results   Component Value Date     11/12/2024    K 4.6 11/12/2024    CL 98 11/12/2024    CO2 32 11/12/2024    BUN 28 (H) 11/12/2024    CREATININE 1.00 11/12/2024    GLUCOSE 181 (H) 11/12/2024    CALCIUM 9.1 11/12/2024       Lipid Profile:    Lab Results   Component Value Date    TRIG 113 10/10/2024    HDL 25.3 10/10/2024    LDLCALC 64 10/10/2024       BMP:  Lab Results   Component Value Date     11/12/2024     11/06/2024     10/14/2024    K 4.6 11/12/2024    K 5.2 11/06/2024    K 4.4 10/14/2024    CL 98 11/12/2024     11/06/2024    CL 98 10/14/2024    CO2 32 11/12/2024    CO2 32 11/06/2024    CO2 34 (H) 10/14/2024    BUN 28 (H) 11/12/2024    BUN 20 11/06/2024    BUN 21 10/14/2024    CREATININE 1.00 11/12/2024    CREATININE 0.98 11/06/2024    CREATININE 1.07 10/14/2024       CBC:  Lab Results   Component Value Date    WBC 7.0 11/06/2024    WBC 6.6 10/14/2024    WBC 6.9 10/13/2024    RBC 6.18 (H) 11/06/2024    RBC 5.60 10/14/2024    RBC 5.51 10/13/2024    HGB 16.5 11/06/2024    HGB 14.9 10/14/2024    HGB 14.8 10/13/2024    HCT 54.5 (H) 11/06/2024    HCT 50.1 10/14/2024    HCT 48.8 10/13/2024    MCV 88 11/06/2024    MCV 90 10/14/2024    MCV 89 10/13/2024    MCH 26.7 11/06/2024    MCH 26.6 10/14/2024    MCH 26.9 10/13/2024    MCHC 30.3 (L) 11/06/2024    MCHC 29.7 (L) 10/14/2024    MCHC 30.3 (L) 10/13/2024    RDW 16.6 (H) 11/06/2024    RDW 15.1 (H) 10/14/2024    RDW  "15.4 (H) 10/13/2024     11/06/2024     10/14/2024     10/13/2024       Hepatic Function Panel:    Lab Results   Component Value Date    ALKPHOS 41 10/09/2024    ALT 13 10/09/2024    AST 16 10/09/2024    PROT 7.0 10/09/2024    BILITOT 1.1 10/09/2024       HgBA1c:    No results found for: \"HGBA1C\"    Magnesium:    Lab Results   Component Value Date    MG 2.24 10/12/2024       TSH:    No results found for: \"TSH\"    BNP:   Lab Results   Component Value Date     (H) 10/09/2024        PT/INR:    Lab Results   Component Value Date    PROTIME 14.4 (H) 11/12/2024    INR 1.3 (H) 11/12/2024       Cardiac Enzymes:    Lab Results   Component Value Date    TROPHS 12 10/09/2024    TROPHS 10 10/09/2024       Patient Active Problem List   Diagnosis    Hypervolemia    Atrial fibrillation (Multi)    Chronic obstructive pulmonary disease (Multi)    Class 3 severe obesity with body mass index (BMI) of 40.0 to 44.9 in adult    HERLINDA (obstructive sleep apnea)    HTN (hypertension)    Diabetes mellitus, type 2 (Multi)    BMI 45.0-49.9, adult (Multi)    Never smoked tobacco    Hospital discharge follow-up    Chronic anticoagulation       Social History     Tobacco Use    Smoking status: Never     Passive exposure: Never    Smokeless tobacco: Current     Types: Chew   Vaping Use    Vaping status: Never Used   Substance Use Topics    Alcohol use: Not Currently    Drug use: Defer       Past Medical History:   Diagnosis Date    Arrhythmia     A-Fib    COPD (chronic obstructive pulmonary disease) (Multi)     Diabetes mellitus (Multi)     Hypertension     HERLINDA (obstructive sleep apnea)          Current Outpatient Medications:     Eliquis 5 mg tablet, Take 1 tablet (5 mg) by mouth 2 times a day., Disp: , Rfl:     furosemide (Lasix) 20 mg tablet, Take 1 tablet (20 mg) by mouth 2 times daily (morning and late afternoon)., Disp: 60 tablet, Rfl: 2    glipiZIDE XL (Glucotrol XL) 10 mg 24 hr tablet, Take 2 tablets (20 mg) by " mouth once daily., Disp: , Rfl:     metoprolol tartrate (Lopressor) 50 mg tablet, Take 1.5 tablets (75 mg) by mouth 2 times a day., Disp: 90 tablet, Rfl: 5    Mounjaro 15 mg/0.5 mL pen injector, Inject under the skin., Disp: , Rfl:     sotalol (Betapace) 120 mg tablet, Take 1.5 tablets (180 mg) by mouth 2 times a day., Disp: 270 tablet, Rfl: 3    losartan (Cozaar) 50 mg tablet, Take 1 tablet (50 mg) by mouth once daily. Replaces lisinopril, Disp: 90 tablet, Rfl: 1    magnesium oxide (Mag-Ox) 400 mg (241.3 mg magnesium) tablet, Take 1 tablet (400 mg) by mouth once daily., Disp: 90 tablet, Rfl: 2    Patient has no known allergies.    No family history on file.    Past Surgical History:   Procedure Laterality Date    CARDIOVERSION      KNEE            Review of systems  Constitutional: No weight loss, fever, chills, weakness or fatigue  HEENT: No visual loss, blurred vision, double vision or yellow sclerae  Skin: No rash or itching  Cardiovascular: No chest pain, pressure or discomfort, No palpitations or edema.  Respiratory: No shortness of breath, cough or sputum  Gastrointestinal: No nausea, vomiting or diarrhea. No bloody or dark tarry stools.  Neurological: No headache, lightheadedness, dizziness, syncope.   Musculoskeletal: No muscle, back pain, joint pain or stiffness.  Hematologic: No anemia, bleeding or bruising.    /80 (BP Location: Left arm)   Pulse 93   Ht 1.829 m (6')   Wt 149 kg (328 lb 6.4 oz)   BMI 44.54 kg/m²     Physical Exam  Constitutional: Well developed, awake/alert x 3, no distress.  Head/Neck: No JVD, No bruits  Respiratory/Thorax: patent airways, CTAB, normal breath sounds with good expansion.  Cardiovascular: Regular rate and rhythm, normal S1 and S2,   Gastrointestinal: Non distended, soft, non-tender, no rebound tenderness or guarding.  Extremities: No cyanosis, edema.   Neurological: Alert and oriented x 3. Moves extremities spontaneous with purpose.  Psychological: Appropriate  mood and behavior  Skin: Warm and Dry. No lesions or rashes.     Assessment/Plan  Atrial fibrillation:  Maintains SR.  EKG as noted above.  Anticoagulation:  Denies bleeding. Continue Eliquis.   High risk medication: EKG as noted above.  Continue Sotalol.  Hypomagnesia:  Continue mag oxide, refills provided  Plans to follow with [place] [facility] for primary cardiology  Follow with Dr. Cordova in 6 months or sooner if needed.        Please excuse any errors in grammar or translation related to dictation, voice recognition software was used to prepare this document.

## 2024-12-24 ENCOUNTER — APPOINTMENT (OUTPATIENT)
Dept: CARDIOLOGY | Facility: CLINIC | Age: 49
End: 2024-12-24
Payer: COMMERCIAL

## 2025-05-26 LAB
NON-UH HIE A/A RATIO ART: 9.5 %
NON-UH HIE AADO2 ART: 568.8 MMHG (ref 5–15)
NON-UH HIE ALLENS TEST: POSITIVE
NON-UH HIE BASE EXCESS ARTERIAL: 4.9 MMOL/L
NON-UH HIE CCA2+ ART: 4.71 MG/DL (ref 4.4–5.3)
NON-UH HIE CCL- ART: 98 MMOL/L (ref 101–111)
NON-UH HIE CGLU ART: 161 MG/DL (ref 55–99)
NON-UH HIE CK+ ART: 4.5 MMOL/L (ref 3.5–5.3)
NON-UH HIE CLAC ART: 0.9 MMOL/L (ref 0.5–2.2)
NON-UH HIE CNA+ ART: 139 MMOL/L (ref 135–145)
NON-UH HIE DEVICE: ABNORMAL
NON-UH HIE DRAWN BY: ABNORMAL
NON-UH HIE FCOHB ART: 2 % (ref 1.5–4.9)
NON-UH HIE FIO2 BG: 100
NON-UH HIE FO2HB ART: 87.9 % (ref 93–100)
NON-UH HIE HCO3 ARTERIAL: 28.5 MMOL/L (ref 22–26)
NON-UH HIE O2 SAT ART: 89.7 % (ref 95–100)
NON-UH HIE P CO2 ARTERIAL: 69.3 MMHG (ref 35–45)
NON-UH HIE P O2 ARTERIAL: 59.9 MMHG (ref 80–100)
NON-UH HIE PH ARTERIAL: 7.31 (ref 7.35–7.45)
NON-UH HIE SAMPLE SITE: ABNORMAL
NON-UH HIE SAMPLE TYPE: ABNORMAL
NON-UH HIE TOTAL HGB ART: 18.7 GM/DL (ref 12–17)

## 2025-06-23 ENCOUNTER — APPOINTMENT (OUTPATIENT)
Dept: CARDIOLOGY | Facility: CLINIC | Age: 50
End: 2025-06-23
Payer: COMMERCIAL

## 2025-07-28 DIAGNOSIS — I50.33 ACUTE ON CHRONIC HEART FAILURE WITH PRESERVED EJECTION FRACTION: ICD-10-CM

## 2025-08-01 NOTE — TELEPHONE ENCOUNTER
I called and could not leave message his voicemail full. If patient calls back can schedule with Buddy

## 2025-08-08 RX ORDER — LOSARTAN POTASSIUM 50 MG/1
50 TABLET ORAL DAILY
Qty: 90 TABLET | Refills: 0 | OUTPATIENT
Start: 2025-08-08